# Patient Record
Sex: FEMALE | Race: WHITE | NOT HISPANIC OR LATINO | Employment: FULL TIME | URBAN - METROPOLITAN AREA
[De-identification: names, ages, dates, MRNs, and addresses within clinical notes are randomized per-mention and may not be internally consistent; named-entity substitution may affect disease eponyms.]

---

## 2017-06-01 ENCOUNTER — OFFICE VISIT (OUTPATIENT)
Dept: GYNECOLOGIC ONCOLOGY | Facility: CLINIC | Age: 37
End: 2017-06-01

## 2017-06-01 VITALS
SYSTOLIC BLOOD PRESSURE: 131 MMHG | DIASTOLIC BLOOD PRESSURE: 79 MMHG | WEIGHT: 173 LBS | BODY MASS INDEX: 29.53 KG/M2 | HEIGHT: 64 IN | TEMPERATURE: 98.1 F | RESPIRATION RATE: 25 BRPM | OXYGEN SATURATION: 98 % | HEART RATE: 115 BPM

## 2017-06-01 DIAGNOSIS — Z01.419 WELL WOMAN EXAM WITH ROUTINE GYNECOLOGICAL EXAM: Primary | ICD-10-CM

## 2017-06-01 DIAGNOSIS — Z30.09 ENCOUNTER FOR OTHER GENERAL COUNSELING OR ADVICE ON CONTRACEPTION: ICD-10-CM

## 2017-06-01 PROCEDURE — 99395 PREV VISIT EST AGE 18-39: CPT | Performed by: NURSE PRACTITIONER

## 2017-06-01 RX ORDER — LAMOTRIGINE 150 MG/1
150 TABLET ORAL 2 TIMES DAILY
COMMUNITY
End: 2019-03-21 | Stop reason: SINTOL

## 2017-06-01 RX ORDER — LANSOPRAZOLE 30 MG/1
30 CAPSULE, DELAYED RELEASE ORAL DAILY
COMMUNITY
End: 2018-07-23 | Stop reason: SDUPTHER

## 2017-06-01 RX ORDER — ARIPIPRAZOLE 5 MG/1
5 TABLET ORAL DAILY
COMMUNITY
End: 2018-05-14 | Stop reason: DRUGHIGH

## 2017-06-01 RX ORDER — DIAZEPAM 5 MG/1
5 TABLET ORAL AS NEEDED
COMMUNITY
End: 2021-03-26 | Stop reason: SDUPTHER

## 2017-06-01 RX ORDER — ACETAMINOPHEN 500 MG
500 TABLET ORAL AS NEEDED
COMMUNITY
End: 2020-11-24 | Stop reason: SDUPTHER

## 2017-06-01 NOTE — PROGRESS NOTES
GYN ONCOLOGY ANNUAL WELL WOMAN VISIT      Jesica Aguilera  [id number]  1980      Chief Complaint: Gynecologic Exam (here for annual and to talk about IUD)        History of present illness:  Jesica Aguilera is a 36 y.o. year old female who is here today for an annual exam.  She has a personal history of dyspareunia, pelvic pain, anal fissure, and hemorrhoids.  She is status post anal fissure repair in 2012.  She denies any new concerns and states she is feeling very well today.  She denies abnormal vaginal bleeding, pelvic pain, concerning lesions, changes in bowel or bladder function, or breast concerns.  She reports bilateral nipple piercings that were done only 1 week ago. She has a history of abnormal Pap smear ×1 and requests a Pap smear was today's exam.    She has a son who is almost 10 years old and she is now desiring of a long-term contraceptive.  At her last annual visit with me in 2015 she had reported a return to smoking and was planning on starting Chantix.  She describes shortly following this time having a bad interaction between her Lamictal and Chantix, causing severe cognitive and mental health changes.  She describes she spent 2-3 days on the behavioral health unit for close monitoring.  These problems have been appropriately treated and she denies SI today.  She does report ongoing daily smoking, approximately 1 pack per day.  She is understanding that estrogen containing contraceptives at her at high risk for blood clots.  She is most interested in alternate contraceptive forms including IUDs.        Obstetric History:  OB History      Para Term  AB TAB SAB Ectopic Multiple Living    1 1        1         Menstrual History:     Patient's last menstrual period was 2017.          Past Medical History:   Diagnosis Date   • Acid reflux    • Anxiety    • Arthritis    • Asthma    • Dyspareunia, female    • Migraines    • Pelvic pain    • Seasonal allergies         Past Surgical History:   Procedure Laterality Date   • HEMORRHOIDECTOMY      anal fissure, sphincter       MEDICATIONS: The current medication list was reviewed and reconciled.     Allergies:  has No Known Allergies.    Family History   Problem Relation Age of Onset   • Hypertension Mother    • Hypertension Father    • No Known Problems Sister    • No Known Problems Brother    • No Known Problems Maternal Grandmother    • No Known Problems Maternal Grandfather    • No Known Problems Paternal Grandmother    • No Known Problems Paternal Grandfather        Health Maintenance:  Last pap smear was 11/2015, results were  normal PAP..    Review of Systems   Constitutional: Negative for fatigue, fever and unexpected weight change.   HENT: Negative for congestion, ear pain, hearing loss, sinus pressure and trouble swallowing.    Eyes: Negative for visual disturbance.   Respiratory: Negative for cough, chest tightness, shortness of breath and wheezing.    Cardiovascular: Negative for chest pain, palpitations and leg swelling.   Gastrointestinal: Negative for abdominal distention, abdominal pain, constipation, diarrhea, nausea and vomiting.   Endocrine: Negative for cold intolerance, heat intolerance, polydipsia, polyphagia and polyuria.   Genitourinary: Negative for difficulty urinating, dyspareunia, dysuria, frequency, hematuria, pelvic pain, urgency, vaginal bleeding, vaginal discharge and vaginal pain.   Musculoskeletal: Negative for arthralgias, gait problem, joint swelling and myalgias.   Skin: Negative for color change, pallor and rash.   Neurological: Negative for dizziness, seizures, syncope, weakness, light-headedness, numbness and headaches.   Hematological: Negative for adenopathy. Does not bruise/bleed easily.   Psychiatric/Behavioral: Negative for agitation, confusion, sleep disturbance and suicidal ideas. The patient is not nervous/anxious.        Physical Exam  Vital Signs: /79  Pulse 115  Temp  "98.1 °F (36.7 °C) (Temporal Artery )   Resp 25  Ht 64\" (162.6 cm)  Wt 173 lb (78.5 kg)  LMP 05/01/2017  SpO2 98%  BMI 29.7 kg/m2   General Appearance:  alert, cooperative, no apparent distress and appears stated age   Neurologic/Psychiatric: A&O x 3, gait steady, appropriate affect   HEENT:  Normocephalic, without obvious abnormality, mucous membranes moist   Neck: Supple, symmetrical, trachea midline, no adenopathy;  No thyromegaly, masses, or tenderness   Back:   Symmetric, no curvature, ROM normal, no CVA tenderness   Lungs:   Clear to auscultation bilaterally; respirations regular, even, and unlabored bilaterally   Heart:  Regular rate and rhythm, no murmurs appreciated   Breasts:  Symmetrical, no masses, no lesions, no nipple discharge and nipple piercing noted bilaterally (healing well)   Abdomen:   Soft, non-tender, non-distended and no organomegaly   Lymph nodes: No cervical, supraclavicular, inguinal or axillary adenopathy noted   Extremities: Normal, atraumatic; no clubbing, cyanosis, or edema    Skin: No rashes, ulcers, or suspicious lesions noted   Pelvic: External Genitalia  without lesions or skin changes  Vagina  is pink, moist, without lesions.   Cervix  normal, without lesions, no discharge, no CMT and pap obtained  Uterus  normal size, midposition, mobile and nontender  Ovaries  without palpable masses or fullness  Parametria  smooth  Rectovaginal  Female rectovaginal: deferred       Procedure Note:  No notes on file    Assessment and Plan:    Jesica was seen today for gynecologic exam.    Diagnoses and all orders for this visit:    Well woman exam with routine gynecological exam  -     Pap IG, HPV-hr; Future    Encounter for other general counseling or advice on contraception        Pap was done today per patient request even though it has been less then 3 years since her last Pap smear.  If she does not receive the results of the Pap within 2 weeks  time, she was instructed to call to find " out the results.  I explained to Jesica that the recommendations for Pap smear interval in a low risk patient's has lengthened to 3 years time.  I encouraged her to be seen yearly for a full physical exam including breast and pelvic exam even during the off years when PAP's will not be performed.    She was recommended to begin mammograms at age 40 for breast cancer screening, colonoscopy at age 50 for colon cancer screening and bone density scans (DEXA) at age 60-65 for osteoporosis screening.    Jesica and I discussed hormonal management options including progestin only mini pill, Depo-Provera injection,  Nexplanon, and various IUDs.  Estrogen containing options were ruled out due to current smoking status.  After discussion she reports she is desiring of a long-term, reversible contraceptive but also allows for her to have light to minimal periods.  At the conclusion of our discussion she is most interested in Mirena.  She is status post vaginal delivery ×1.  I informed her that the ideal time to place this is when she is currently on her period.  She states this should start in approximately 6-8 days.  This should allow for ample time to order and obtain the device.  She will be called once device to clinic to schedule placement.  Our office orders IUDs through [facility].  An order form will be faxed today and again will prior authorized this with her insurance.  If there is any issue with insurance coverage our office and patient will both be notified prior to shipment.  She verbalized understanding.  IUD insertion procedure risks, preparation, and precautions reviewed.  She was instructed to take 600-800 mg ibuprofen 30 minutes prior to insertion.       Return for IUD insertion.      JAXON Becerra      Note: Speech recognition transcription software was used to dictate portions of this document.  An attempt at proofreading has been made though minor errors in transcription may still be  present.  Please do not hesitate to call our office with any questions.

## 2017-06-05 ENCOUNTER — TELEPHONE (OUTPATIENT)
Dept: GYNECOLOGIC ONCOLOGY | Facility: CLINIC | Age: 37
End: 2017-06-05

## 2017-06-05 PROBLEM — M19.90 ARTHRITIS: Status: ACTIVE | Noted: 2017-06-05

## 2017-06-05 PROBLEM — R10.9 ABDOMINAL PAIN: Status: ACTIVE | Noted: 2017-06-05

## 2017-06-05 PROBLEM — G43.909 MIGRAINES: Status: ACTIVE | Noted: 2017-06-05

## 2017-06-05 PROBLEM — L73.9 FOLLICULITIS: Status: ACTIVE | Noted: 2017-06-05

## 2017-06-05 NOTE — TELEPHONE ENCOUNTER
Patient returned my call about mirena placement. She is to have it put in late on Friday evening.

## 2017-06-09 ENCOUNTER — OFFICE VISIT (OUTPATIENT)
Dept: GYNECOLOGIC ONCOLOGY | Facility: CLINIC | Age: 37
End: 2017-06-09

## 2017-06-09 VITALS
SYSTOLIC BLOOD PRESSURE: 127 MMHG | HEART RATE: 99 BPM | TEMPERATURE: 98.3 F | DIASTOLIC BLOOD PRESSURE: 78 MMHG | BODY MASS INDEX: 29.87 KG/M2 | WEIGHT: 174 LBS | RESPIRATION RATE: 16 BRPM | OXYGEN SATURATION: 96 %

## 2017-06-09 DIAGNOSIS — Z30.430 ENCOUNTER FOR INSERTION OF INTRAUTERINE CONTRACEPTIVE DEVICE: Primary | ICD-10-CM

## 2017-06-09 PROCEDURE — 99212-NC PR NO CHARGE CBC OFFICE OUTPATIENT VISIT 10 MINUTES: Performed by: NURSE PRACTITIONER

## 2017-06-09 PROCEDURE — 58300 INSERT INTRAUTERINE DEVICE: CPT | Performed by: NURSE PRACTITIONER

## 2017-06-09 NOTE — PROGRESS NOTES
IUD INSERTION    Jesica Aguilera  1980  [id number]      After discussion of procedure risks and benefits and obtaining consent, an IUD insertion was performed. The patient was placed in stirrups and a bimanual exam was performed. The uterus was found to be mid position. A speculum exam was then performed and the cervix was well visualized. Cervix was cleaned with Betadine. Uterus sounded to 7 cm and a tenaculum was not needed to grasp the anterior cervix. Mirena was then inserted without complication. Strings were cut to approx 3-4 cm and speculum was removed. There was no bleeding noted with procedure and the patient tolerated this well. Home precautions were discussed and patient was instructed to take ibuprofen for cramping at home tonight.    Encouraged to call the office for any increase in bleeding, pain, or fevers. Call if unable to feel IUD strings.  RTC for annual exam or PRN.

## 2017-06-30 ENCOUNTER — TELEPHONE (OUTPATIENT)
Dept: GYNECOLOGIC ONCOLOGY | Facility: CLINIC | Age: 37
End: 2017-06-30

## 2017-06-30 NOTE — TELEPHONE ENCOUNTER
Tried calling patient back about bleeding after mirena insertion. She did not answer. She had called  complaining of vaginal bleeding for 9 days that began as spotting and then a normal pharmacy.Informed patient having a period of after mirena insertion was normal. To monitor this. Any heavier bleeding saturating pads every hour or two. New onset of fatigue or dizziness to contact our medical exchange as we will not be in the office until Wednesday.  I informed her Lani was aware of this issue and would be contacting her. This was all left in the patients voicemail.

## 2018-01-10 ENCOUNTER — OFFICE VISIT (OUTPATIENT)
Dept: PULMONOLOGY | Facility: CLINIC | Age: 38
End: 2018-01-10

## 2018-01-10 VITALS
WEIGHT: 187 LBS | SYSTOLIC BLOOD PRESSURE: 120 MMHG | HEIGHT: 64 IN | HEART RATE: 86 BPM | OXYGEN SATURATION: 98 % | TEMPERATURE: 98.7 F | BODY MASS INDEX: 31.92 KG/M2 | DIASTOLIC BLOOD PRESSURE: 74 MMHG

## 2018-01-10 DIAGNOSIS — R06.02 SHORTNESS OF BREATH: Primary | ICD-10-CM

## 2018-01-10 DIAGNOSIS — Z71.6 TOBACCO ABUSE COUNSELING: ICD-10-CM

## 2018-01-10 DIAGNOSIS — J44.9 CHRONIC OBSTRUCTIVE PULMONARY DISEASE, UNSPECIFIED COPD TYPE (HCC): ICD-10-CM

## 2018-01-10 PROCEDURE — 94729 DIFFUSING CAPACITY: CPT | Performed by: INTERNAL MEDICINE

## 2018-01-10 PROCEDURE — 94060 EVALUATION OF WHEEZING: CPT | Performed by: INTERNAL MEDICINE

## 2018-01-10 PROCEDURE — 94726 PLETHYSMOGRAPHY LUNG VOLUMES: CPT | Performed by: INTERNAL MEDICINE

## 2018-01-10 PROCEDURE — 99407 BEHAV CHNG SMOKING > 10 MIN: CPT | Performed by: INTERNAL MEDICINE

## 2018-01-10 PROCEDURE — 99204 OFFICE O/P NEW MOD 45 MIN: CPT | Performed by: INTERNAL MEDICINE

## 2018-01-10 RX ORDER — LEVALBUTEROL TARTRATE 45 UG/1
3 AEROSOL, METERED ORAL ONCE
Status: COMPLETED | OUTPATIENT
Start: 2018-01-10 | End: 2018-01-10

## 2018-01-10 RX ORDER — ALBUTEROL SULFATE 90 UG/1
2 AEROSOL, METERED RESPIRATORY (INHALATION) EVERY 4 HOURS PRN
COMMUNITY
End: 2020-09-15 | Stop reason: SDUPTHER

## 2018-01-10 RX ADMIN — LEVALBUTEROL TARTRATE 3 PUFF: 45 AEROSOL, METERED ORAL at 08:29

## 2018-01-10 NOTE — PROGRESS NOTES
Initial Pulmonary Consult Note    Subjective   Reason for consultation/Chief Complaint: Shortness of Breath    Jesica Aguilera is a 37 y.o. female is being seen for consultation today at the request of Dr. Herrera    History of Present Illness  Ms. Aguilera is a 38yo F with a history of tobacco abuse who is referred for shortness of breath. She thinks that her symptoms have been ongoing for years but have recently worsened over the past couple of years. Her shortness of breath mainly occurs with exertion. She has associated cough and nighttime awakenings almost every night. She also notes wheezing that is worse with exertion and at night. She has an Albuterol inhaler but she doesn't use it. She cannot think of any relieving factors. She has a significant smoking history. She has smoked 1-1.5ppd since the age of 14. She has tried Chantix which worked for smoking cessation. However, it exacerbated her Bipolar disorder and she ended up in the Behavioral Health Unit at [facility] for 3 days. The longest time that she has been able to quit was 1-2 years when she was pregnant with her son. She began smoking again when he was approximately 1 year of age. Her mother has COPD which was diagnosed in her 50s. She thinks her Dad likely has COPD as well but he has not been tested. All of her siblings smoke.       Active Ambulatory Problems     Diagnosis Date Noted   • Seasonal allergies 06/05/2017   • Acid reflux 06/05/2017   • Arthritis 06/05/2017   • Migraines 06/05/2017   • Abdominal pain 06/05/2017   • Folliculitis 06/05/2017     Resolved Ambulatory Problems     Diagnosis Date Noted   • No Resolved Ambulatory Problems     Past Medical History:   Diagnosis Date   • Acid reflux    • Anxiety    • Arthritis    • Asthma    • Dyspareunia, female    • Fatigue    • Migraines    • Pelvic pain    • Seasonal allergies        Past Surgical History:   Procedure Laterality Date   • HEMORRHOIDECTOMY      anal fissure, sphincter        Family History   Problem Relation Age of Onset   • Hypertension Mother    • Hypertension Father    • No Known Problems Sister    • No Known Problems Brother    • No Known Problems Maternal Grandmother    • No Known Problems Maternal Grandfather    • No Known Problems Paternal Grandmother    • No Known Problems Paternal Grandfather        Social History     Social History   • Marital status:      Spouse name: N/A   • Number of children: 1   • Years of education: N/A     Occupational History   • Not on file.     Social History Main Topics   • Smoking status: Current Every Day Smoker     Packs/day: 0.50     Types: Cigarettes   • Smokeless tobacco: Not on file   • Alcohol use No      Comment: rare   • Drug use: No   • Sexual activity: Defer     Other Topics Concern   • Not on file     Social History Narrative       No Known Allergies    Current Outpatient Prescriptions   Medication Sig Dispense Refill   • acetaminophen (TYLENOL) 500 MG tablet Take 500 mg by mouth As Needed for Mild Pain (1-3).     • albuterol (PROVENTIL HFA;VENTOLIN HFA) 108 (90 Base) MCG/ACT inhaler Inhale 2 puffs Every 4 (Four) Hours As Needed for Wheezing.     • ARIPiprazole (ABILIFY) 5 MG tablet Take 5 mg by mouth Daily. Takes half a tab     • diazePAM (VALIUM) 5 MG tablet Take 5 mg by mouth As Needed for Anxiety.     • lamoTRIgine (LAMICTAL) 150 MG tablet Take 150 mg by mouth 2 (Two) Times a Day.     • lansoprazole (PREVACID) 30 MG capsule Take 30 mg by mouth Daily.     • levonorgestrel (MIRENA, 52 MG,) 20 MCG/24HR IUD 1 each by Intrauterine route 1 (One) Time.     • methocarbamol (ROBAXIN) 750 MG tablet Take 1 tablet by mouth 3 (Three) Times a Day As Needed for Muscle Spasms. 30 tablet 0   • Omega-3 Fatty Acids (OMEGA 3 PO) Take  by mouth Daily.     • Probiotic Product (PROBIOTIC ADVANCED PO) Take  by mouth Daily.     • tiotropium bromide-olodaterol (STIOLTO RESPIMAT) 2.5-2.5 MCG/ACT aerosol solution inhaler Inhale 1 puff Daily. 2 inh  "once a day 1 inhaler 0     No current facility-administered medications for this visit.        Review of Systems   Constitutional: Positive for activity change and fatigue.   HENT: Positive for rhinorrhea, sinus pressure, sneezing and sore throat.    Eyes: Positive for photophobia and itching.   Respiratory: Positive for cough, chest tightness, shortness of breath and wheezing.    Cardiovascular: Negative.    Gastrointestinal: Negative.    Endocrine: Negative.    Genitourinary: Negative.    Musculoskeletal: Positive for back pain and neck stiffness.   Skin: Negative.    Allergic/Immunologic: Negative.    Neurological: Positive for dizziness, light-headedness and headaches.   Hematological: Negative.    Psychiatric/Behavioral: Positive for agitation and dysphoric mood. The patient is nervous/anxious.          Objective   Blood pressure 120/74, pulse 86, temperature 98.7 °F (37.1 °C), height 162.6 cm (64\"), weight 84.8 kg (187 lb), SpO2 98 %.  Physical Exam   Constitutional: She is oriented to person, place, and time. She appears well-developed and well-nourished. No distress.   HENT:   Head: Normocephalic and atraumatic.   Mouth/Throat: Oropharynx is clear and moist.   Eyes: Conjunctivae are normal. Pupils are equal, round, and reactive to light. No scleral icterus.   Neck: Normal range of motion. Neck supple. No tracheal deviation present. No thyromegaly present.   Cardiovascular: Normal rate, regular rhythm and normal heart sounds.    Pulmonary/Chest: Effort normal and breath sounds normal. No respiratory distress.   Abdominal: Soft. Bowel sounds are normal. There is no tenderness.   Musculoskeletal: Normal range of motion. She exhibits no edema.   Lymphadenopathy:     She has no cervical adenopathy.   Neurological: She is alert and oriented to person, place, and time. She exhibits normal muscle tone. Coordination normal.   Skin: Skin is warm and dry. No rash noted. No erythema.   Psychiatric: She has a normal mood " and affect. Her speech is normal and behavior is normal. Judgment normal.   Nursing note and vitals reviewed.      PFTs:  Performed in clinic and personally reviewed.   There is moderate airway obstruction. There is no significant bronchodilator response.   There is mild restriction.   DLCO is normal.     Imaging:  CXR performed in clinic and personally reviewed. There is no cardiomegaly. The lung fields are well expanded bilaterally. There are no focal infiltrates or suspicious nodules appreciable. There is no acute cardiopulmonary process.     Assessment/Plan   Jesica was seen today for cough and shortness of breath.    Diagnoses and all orders for this visit:    Shortness of breath  -     Pulmonary Function Test  -     levalbuterol (XOPENEX HFA) inhaler 3 puff; Inhale 3 puffs 1 (One) Time.  -     XR Chest PA & Lateral    Chronic obstructive pulmonary disease, unspecified COPD type    Tobacco abuse counseling    Other orders  -     tiotropium bromide-olodaterol (STIOLTO RESPIMAT) 2.5-2.5 MCG/ACT aerosol solution inhaler; Inhale 1 puff Daily. 2 inh once a day        Discussion:  Ms. Aguilera is a 36yo F with a history of tobacco abuse who is referred for shortness of breath. Her PFTs are consistent with moderate COPD.     Plan:  1. Start Stiolto once daily. She will let us know if this helps. If she notes improvement, we will send in a Rx.   2. Check Alpha-1-Antitrypsin phenotype and level.   3. > 50% of the visit devoted to smoking cessation counseling. We discussed the various methods and resources available to quit smoking. She has set a quit date of April. She will work with her therapist to help with medication adjustments and behavior therapy.   4. Return to clinic in 4 months or sooner if needed.          I personally spent over half of a total 45 minutes face to face with the patient in counseling and discussion and/or coordination of care as described above.       Monica V Case, DO  Pulmonary and Critical  Care Medicine

## 2018-02-07 ENCOUNTER — DOCUMENTATION (OUTPATIENT)
Dept: PULMONOLOGY | Facility: CLINIC | Age: 38
End: 2018-02-07

## 2018-02-07 NOTE — PROGRESS NOTES
Spoke to pt today. Informed her of change of inhalers due to insurance. Pt was concerned about this, she doesn't like using inhalers and feels the Stiolto worked for her. I explained to pt that a PA would be denied until she tries the preferred alternative. I advised pt to call if she has any issues with the Anoro. Pt stated understanding.

## 2018-03-02 ENCOUNTER — TELEPHONE (OUTPATIENT)
Dept: PULMONOLOGY | Facility: CLINIC | Age: 38
End: 2018-03-02

## 2018-03-02 NOTE — TELEPHONE ENCOUNTER
Pt GOYOM requesting a call back @ [phone] regarding her inhaler Anoro. Spoke with pt and she stated that she was given samples of Rx Stiolto Respimat and seems to be working very well and needing samples until PA gets done for Rx Anoro. Informed pt that samples of Rx Stiolto Respimat will be placed up front and will inform Katherine(MA) that PA can be started for Rx Anoro. Pt verbalized understanding.

## 2018-03-02 NOTE — TELEPHONE ENCOUNTER
PA submitted on Covermymeds, approved. Called pt and informed her. Advised pt she should have a rx waiting for her at the pharmacy from the last rx sent but if not or if she needs additional refills to call us back. Pt stated understanding.

## 2018-03-13 ENCOUNTER — TELEPHONE (OUTPATIENT)
Dept: GYNECOLOGIC ONCOLOGY | Facility: CLINIC | Age: 38
End: 2018-03-13

## 2018-03-13 ENCOUNTER — OFFICE VISIT (OUTPATIENT)
Dept: GYNECOLOGIC ONCOLOGY | Facility: CLINIC | Age: 38
End: 2018-03-13

## 2018-03-13 VITALS
HEART RATE: 106 BPM | SYSTOLIC BLOOD PRESSURE: 120 MMHG | OXYGEN SATURATION: 97 % | DIASTOLIC BLOOD PRESSURE: 69 MMHG | RESPIRATION RATE: 16 BRPM | BODY MASS INDEX: 32.1 KG/M2 | WEIGHT: 187 LBS | TEMPERATURE: 98.6 F

## 2018-03-13 DIAGNOSIS — S31.109A WOUND OF LEFT GROIN, INITIAL ENCOUNTER: Primary | ICD-10-CM

## 2018-03-13 DIAGNOSIS — L73.9 FOLLICULITIS: ICD-10-CM

## 2018-03-13 PROCEDURE — 99213 OFFICE O/P EST LOW 20 MIN: CPT | Performed by: NURSE PRACTITIONER

## 2018-03-13 PROCEDURE — 10060 I&D ABSCESS SIMPLE/SINGLE: CPT | Performed by: NURSE PRACTITIONER

## 2018-03-13 RX ORDER — BUPROPION HYDROCHLORIDE 150 MG/1
150 TABLET ORAL DAILY
COMMUNITY
End: 2018-07-23

## 2018-03-13 RX ORDER — SPIRONOLACTONE 25 MG/1
25 TABLET ORAL 2 TIMES DAILY
Qty: 60 TABLET | Refills: 1 | Status: SHIPPED | OUTPATIENT
Start: 2018-03-13 | End: 2018-05-14 | Stop reason: SDUPTHER

## 2018-03-14 PROCEDURE — 87147 CULTURE TYPE IMMUNOLOGIC: CPT | Performed by: NURSE PRACTITIONER

## 2018-03-14 PROCEDURE — 87070 CULTURE OTHR SPECIMN AEROBIC: CPT | Performed by: NURSE PRACTITIONER

## 2018-03-14 PROCEDURE — 87077 CULTURE AEROBIC IDENTIFY: CPT | Performed by: NURSE PRACTITIONER

## 2018-03-14 PROCEDURE — 87205 SMEAR GRAM STAIN: CPT | Performed by: NURSE PRACTITIONER

## 2018-03-14 PROCEDURE — 87186 SC STD MICRODIL/AGAR DIL: CPT | Performed by: NURSE PRACTITIONER

## 2018-03-19 ENCOUNTER — TELEPHONE (OUTPATIENT)
Dept: GYNECOLOGIC ONCOLOGY | Facility: CLINIC | Age: 38
End: 2018-03-19

## 2018-03-19 NOTE — TELEPHONE ENCOUNTER
AJXON Becerra MA             Please call lab and request susceptibility. Thanks!            Lab notified. They advised it was normal skin jamey. Noted. Lani Guerrero wants this susceptibility performed because the patient has a recurrent infection that has yet to be successfully treated with abx.

## 2018-03-20 ENCOUNTER — TELEPHONE (OUTPATIENT)
Dept: GYNECOLOGIC ONCOLOGY | Facility: CLINIC | Age: 38
End: 2018-03-20

## 2018-03-20 NOTE — TELEPHONE ENCOUNTER
Patient called for culture results. Pt informed suseptibility sent to lab for abx. Patient states she has another bump coming up in the same area. I asked if she washes her sheets regularly and does hibiclens and sits baths. She states she does it all with no relief. 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 She was infected as a child and has had multiple problems. She is a little frustrated that we are waiting on an abx because she states it may clear up what is currently going on but it will ultimately re-occur, but is hopeful that it may work. Told her glenda will call with a plan.

## 2018-03-21 LAB
BACTERIA SPEC AEROBE CULT: ABNORMAL
BACTERIA SPEC AEROBE CULT: ABNORMAL
GRAM STN SPEC: ABNORMAL

## 2018-03-21 NOTE — PROGRESS NOTES
GYN ONCOLOGY FOLLOW-UP    Jesica Aguilera  [id number]  1980    Chief Complaint: Follow-up (recurrent boils )        History of present illness:  Jesica Aguilera is a 37 y.o. year old female who is here today for complaint of recurrent boils to bilateral inner thighs. These have been a recurrent problem for her for some time now. She has 2 new areas to bilateral inner thighs (one on each) that started 5 days ago and are very painful. The area to the right side ruptured and drained spontaneously at home yesterday but is not yet resolved. The area to the left is very large. She states these rub against her clothing, making sitting and walking very painful. She feels these occur cyclicly each month around her period and wonders if there may be a hormonal component.   Jesica has tried OTC washes and topical Bactroban ointment without relief. She feels frustrated as dermatology has been unable to help although she has been evaluated multiple times.         Past Medical History:   Diagnosis Date   • Acid reflux    • Anxiety    • Arthritis    • Asthma    • Dyspareunia, female    • Fatigue    • Migraines    • Pelvic pain    • Seasonal allergies        Past Surgical History:   Procedure Laterality Date   • HEMORRHOIDECTOMY      anal fissure, sphincter       MEDICATIONS: The current medication list was reviewed and reconciled.     Allergies:  has No Known Allergies.    Family History   Problem Relation Age of Onset   • Hypertension Mother    • Hypertension Father    • No Known Problems Sister    • No Known Problems Brother    • No Known Problems Maternal Grandmother    • No Known Problems Maternal Grandfather    • No Known Problems Paternal Grandmother    • No Known Problems Paternal Grandfather        Review of Systems   Constitutional: Negative for appetite change, chills, fatigue, fever and unexpected weight change.   Respiratory: Negative for cough, shortness of breath and wheezing.    Cardiovascular: Negative  for chest pain, palpitations and leg swelling.   Gastrointestinal: Negative for abdominal distention, abdominal pain, blood in stool, constipation, diarrhea, nausea and vomiting.   Endocrine: Negative.    Genitourinary: Positive for genital sores (boils to bilateral inner thighs and external genitalia, recurrent). Negative for dyspareunia, dysuria, frequency, hematuria, pelvic pain, urgency, vaginal bleeding, vaginal discharge and vaginal pain.   Musculoskeletal: Negative for arthralgias, gait problem and joint swelling.   Neurological: Negative for dizziness, seizures, syncope, weakness, light-headedness, numbness and headaches.   Hematological: Negative for adenopathy.   Psychiatric/Behavioral: Negative.        Physical Exam  Vital Signs: /69   Pulse 106   Temp 98.6 °F (37 °C) (Temporal Artery )   Resp 16   Wt 84.8 kg (187 lb)   SpO2 97%   BMI 32.10 kg/m²    General Appearance:  alert, cooperative, no apparent distress and appears stated age   Neurologic/Psychiatric: A&O x 3, gait steady, appropriate affect   HEENT:  Normocephalic, without obvious abnormality, mucous membranes moist   Abdomen:   Soft, non-tender, non-distended, no organomegaly and Exam limited d/t habitus.   Lymph nodes: No cervical, supraclavicular, inguinal or axillary adenopathy noted   Extremities: Normal, atraumatic; no clubbing, cyanosis, or edema    Pelvic: External Genitalia  no vulvar lesions noted bilaterally. Large erythematous, tender area to left innr thigh near inguinal fold and smaller erythematous tender area to right inner thigh near inguinal fold. Right area has evidence of opening where fluid has drained  Vagina  is pink, moist, without lesions.   Cervix  normal, without lesions, no discharge, no CMT and IUD strings well visualized  Uterus  normal size, midposition, mobile and nontender  Ovaries  without palpable masses or fullness  Parametria  smooth  Rectovaginal  Female rectovaginal: deferred       Procedure  Notes:  I&D of bilateral lesions attempted. 1% lidocaine injected locally and small incisions made bilaterally. Only minimal drainage could be achieved. Swab obtained for culture. Bleeding was minimal and patient tolerated procedure well.     Assessment and Plan:    Jesica was seen today for follow-up.    Diagnoses and all orders for this visit:    Wound of left groin, initial encounter  -     Wound Culture - Drainage, Groin; Future  -     Wound Culture - Drainage, Groin    Folliculitis    Other orders  -     spironolactone (ALDACTONE) 25 MG tablet; Take 1 tablet by mouth 2 (Two) Times a Day.        Only minimal drainage of bilateral boils could be achieved. There is concern regarding possible tracking beneath right side due to size of the infected area. Patient reports topical antibiotics have not been helpful. Culture obtained today and we discussed plan for antibiotic suppression. Boils also tend to recur around her menses and may have a hormonal component. We will also attempt management with spironolactone in an attempt to suppress recurrences. We reviewed medication instructions, risks, benefits, and potential side effects. She will be notified of culture and sensitivity results and started on antibiotic therapy once this has returned. She v/u.   She was also encouraged to get Hibiclens OTC and clean area twice daily, avoiding the vagina if possible.   We will plan short term follow-up of new treatments in approx 8 weeks.     Return in about 8 weeks (around 5/8/2018) for follow-up.      JAXON Becerra      Note: Speech recognition transcription software was used to dictate portions of this document.  An attempt at proofreading has been made though minor errors in transcription may still be present.  Please do not hesitate to call our office with any questions.

## 2018-03-22 RX ORDER — SULFAMETHOXAZOLE AND TRIMETHOPRIM 400; 80 MG/1; MG/1
1 TABLET ORAL DAILY
Qty: 30 TABLET | Refills: 1 | Status: SHIPPED | OUTPATIENT
Start: 2018-03-22 | End: 2018-05-14 | Stop reason: SDUPTHER

## 2018-03-22 NOTE — TELEPHONE ENCOUNTER
Called patient to discuss culture results. Susceptibility to Bactrim found. Will start on daily antibiotic suppression with low-dose Bactrim in addition to daily Hibiclens and spironolactone. Encouraged to call if any GI upset from medication and this can be changed if needed. If tolerating well, continue treatment until next visit in 5/2018.

## 2018-04-20 ENCOUNTER — HOSPITAL ENCOUNTER (EMERGENCY)
Facility: HOSPITAL | Age: 38
Discharge: HOME | End: 2018-04-20
Attending: EMERGENCY MEDICINE | Admitting: EMERGENCY MEDICINE

## 2018-04-20 ENCOUNTER — APPOINTMENT (OUTPATIENT)
Dept: CT IMAGING | Facility: HOSPITAL | Age: 38
End: 2018-04-20

## 2018-04-20 VITALS
RESPIRATION RATE: 16 BRPM | HEART RATE: 78 BPM | OXYGEN SATURATION: 99 % | BODY MASS INDEX: 30.73 KG/M2 | TEMPERATURE: 98.6 F | WEIGHT: 180 LBS | DIASTOLIC BLOOD PRESSURE: 85 MMHG | SYSTOLIC BLOOD PRESSURE: 118 MMHG | HEIGHT: 64 IN

## 2018-04-20 DIAGNOSIS — R10.9 ACUTE RIGHT FLANK PAIN: Primary | ICD-10-CM

## 2018-04-20 LAB
ALBUMIN SERPL-MCNC: 5 G/DL (ref 3.2–4.8)
ALBUMIN/GLOB SERPL: 1.8 G/DL (ref 1.5–2.5)
ALP SERPL-CCNC: 70 U/L (ref 25–100)
ALT SERPL W P-5'-P-CCNC: 27 U/L (ref 7–40)
ANION GAP SERPL CALCULATED.3IONS-SCNC: 5 MMOL/L (ref 3–11)
AST SERPL-CCNC: 26 U/L (ref 0–33)
B-HCG UR QL: NEGATIVE
BACTERIA UR QL AUTO: ABNORMAL /HPF
BASOPHILS # BLD AUTO: 0.03 10*3/MM3 (ref 0–0.2)
BASOPHILS NFR BLD AUTO: 0.3 % (ref 0–1)
BILIRUB SERPL-MCNC: 0.4 MG/DL (ref 0.3–1.2)
BILIRUB UR QL STRIP: NEGATIVE
BUN BLD-MCNC: 11 MG/DL (ref 9–23)
BUN/CREAT SERPL: 11 (ref 7–25)
CALCIUM SPEC-SCNC: 9.2 MG/DL (ref 8.7–10.4)
CHLORIDE SERPL-SCNC: 101 MMOL/L (ref 99–109)
CLARITY UR: CLEAR
CO2 SERPL-SCNC: 26 MMOL/L (ref 20–31)
COLOR UR: YELLOW
CREAT BLD-MCNC: 1 MG/DL (ref 0.6–1.3)
DEPRECATED RDW RBC AUTO: 43.2 FL (ref 37–54)
EOSINOPHIL # BLD AUTO: 0.18 10*3/MM3 (ref 0–0.3)
EOSINOPHIL NFR BLD AUTO: 2 % (ref 0–3)
ERYTHROCYTE [DISTWIDTH] IN BLOOD BY AUTOMATED COUNT: 12.8 % (ref 11.3–14.5)
GFR SERPL CREATININE-BSD FRML MDRD: 63 ML/MIN/1.73
GLOBULIN UR ELPH-MCNC: 2.8 GM/DL
GLUCOSE BLD-MCNC: 103 MG/DL (ref 70–100)
GLUCOSE UR STRIP-MCNC: NEGATIVE MG/DL
HCT VFR BLD AUTO: 44.8 % (ref 34.5–44)
HGB BLD-MCNC: 15.2 G/DL (ref 11.5–15.5)
HGB UR QL STRIP.AUTO: NEGATIVE
HOLD SPECIMEN: NORMAL
HOLD SPECIMEN: NORMAL
HYALINE CASTS UR QL AUTO: ABNORMAL /LPF
IMM GRANULOCYTES # BLD: 0.03 10*3/MM3 (ref 0–0.03)
IMM GRANULOCYTES NFR BLD: 0.3 % (ref 0–0.6)
INTERNAL NEGATIVE CONTROL: NEGATIVE
INTERNAL POSITIVE CONTROL: POSITIVE
KETONES UR QL STRIP: NEGATIVE
LEUKOCYTE ESTERASE UR QL STRIP.AUTO: ABNORMAL
LIPASE SERPL-CCNC: 41 U/L (ref 6–51)
LYMPHOCYTES # BLD AUTO: 2.23 10*3/MM3 (ref 0.6–4.8)
LYMPHOCYTES NFR BLD AUTO: 25.4 % (ref 24–44)
Lab: NORMAL
MCH RBC QN AUTO: 31.5 PG (ref 27–31)
MCHC RBC AUTO-ENTMCNC: 33.9 G/DL (ref 32–36)
MCV RBC AUTO: 92.9 FL (ref 80–99)
MONOCYTES # BLD AUTO: 0.39 10*3/MM3 (ref 0–1)
MONOCYTES NFR BLD AUTO: 4.4 % (ref 0–12)
NEUTROPHILS # BLD AUTO: 5.96 10*3/MM3 (ref 1.5–8.3)
NEUTROPHILS NFR BLD AUTO: 67.9 % (ref 41–71)
NITRITE UR QL STRIP: NEGATIVE
PH UR STRIP.AUTO: 6 [PH] (ref 5–8)
PLATELET # BLD AUTO: 211 10*3/MM3 (ref 150–450)
PMV BLD AUTO: 10.4 FL (ref 6–12)
POTASSIUM BLD-SCNC: 3.8 MMOL/L (ref 3.5–5.5)
PROT SERPL-MCNC: 7.8 G/DL (ref 5.7–8.2)
PROT UR QL STRIP: NEGATIVE
RBC # BLD AUTO: 4.82 10*6/MM3 (ref 3.89–5.14)
RBC # UR: ABNORMAL /HPF
REF LAB TEST METHOD: ABNORMAL
SODIUM BLD-SCNC: 132 MMOL/L (ref 132–146)
SP GR UR STRIP: <=1.005 (ref 1–1.03)
SQUAMOUS #/AREA URNS HPF: ABNORMAL /HPF
UROBILINOGEN UR QL STRIP: ABNORMAL
WBC NRBC COR # BLD: 8.79 10*3/MM3 (ref 3.5–10.8)
WBC UR QL AUTO: ABNORMAL /HPF
WHOLE BLOOD HOLD SPECIMEN: NORMAL
WHOLE BLOOD HOLD SPECIMEN: NORMAL

## 2018-04-20 PROCEDURE — 85025 COMPLETE CBC W/AUTO DIFF WBC: CPT | Performed by: EMERGENCY MEDICINE

## 2018-04-20 PROCEDURE — 99284 EMERGENCY DEPT VISIT MOD MDM: CPT

## 2018-04-20 PROCEDURE — 36415 COLL VENOUS BLD VENIPUNCTURE: CPT

## 2018-04-20 PROCEDURE — 74176 CT ABD & PELVIS W/O CONTRAST: CPT

## 2018-04-20 PROCEDURE — 83690 ASSAY OF LIPASE: CPT | Performed by: EMERGENCY MEDICINE

## 2018-04-20 PROCEDURE — 80053 COMPREHEN METABOLIC PANEL: CPT | Performed by: EMERGENCY MEDICINE

## 2018-04-20 PROCEDURE — 81001 URINALYSIS AUTO W/SCOPE: CPT | Performed by: EMERGENCY MEDICINE

## 2018-04-20 RX ORDER — SODIUM CHLORIDE 0.9 % (FLUSH) 0.9 %
10 SYRINGE (ML) INJECTION AS NEEDED
Status: DISCONTINUED | OUTPATIENT
Start: 2018-04-20 | End: 2018-04-20 | Stop reason: HOSPADM

## 2018-04-20 NOTE — ED PROVIDER NOTES
Subjective   37-year-old female presents to the emergency department with complaints of right sided abdominal pain and right flank pain.  The patient states the pain began 6 days ago.  The pain was initially more in the mid abdomen that has now migrated into the right flank.  She has had some nausea without vomiting.  Normal urination and normal bowel movements.  No fever or chills.  She has a Mirena IUD in place for the past year.  No vaginal d/c or bleeding.  The pain is worse with touching the right side and with movement.      Past medical history of COPD.    No prior surgeries.    She smokes 1 ppd.  No alcohol or drug use.    Her PCP is Dr. Rohit Herrera.        History provided by:  Patient  Abdominal Pain   Pain location:  R flank  Pain quality: aching    Pain radiates to:  Back and R flank  Pain severity:  Moderate  Onset quality:  Gradual  Duration:  1 week  Timing:  Constant  Progression:  Worsening  Chronicity:  New  Relieved by:  Nothing  Worsened by:  Movement and palpation  Ineffective treatments:  None tried  Associated symptoms: nausea    Associated symptoms: no anorexia, no chest pain, no chills, no constipation, no cough, no diarrhea, no dysuria, no fever, no hematuria, no melena, no shortness of breath, no sore throat and no vomiting        Review of Systems   Constitutional: Negative for appetite change, chills and fever.   HENT: Negative for sore throat.    Respiratory: Negative for cough and shortness of breath.    Cardiovascular: Negative for chest pain.   Gastrointestinal: Positive for abdominal pain and nausea. Negative for anorexia, blood in stool, constipation, diarrhea, melena and vomiting.   Endocrine: Negative for polydipsia, polyphagia and polyuria.   Genitourinary: Negative for dysuria and hematuria.   Musculoskeletal: Positive for back pain.   Skin: Negative for rash.   Allergic/Immunologic: Negative for immunocompromised state.   Neurological: Negative for numbness and headaches.    Hematological: Negative.    Psychiatric/Behavioral: Negative.        Past Medical History:   Diagnosis Date   • Acid reflux    • Anxiety    • Arthritis    • Asthma    • Dyspareunia, female    • Fatigue    • Migraines    • Pelvic pain    • Seasonal allergies        No Known Allergies    Past Surgical History:   Procedure Laterality Date   • HEMORRHOIDECTOMY      anal fissure, sphincter       Family History   Problem Relation Age of Onset   • Hypertension Mother    • Hypertension Father    • No Known Problems Sister    • No Known Problems Brother    • No Known Problems Maternal Grandmother    • No Known Problems Maternal Grandfather    • No Known Problems Paternal Grandmother    • No Known Problems Paternal Grandfather        Social History     Social History   • Marital status:    • Number of children: 1     Social History Main Topics   • Smoking status: Current Every Day Smoker     Packs/day: 1.00     Types: Cigarettes   • Alcohol use No      Comment: rare   • Drug use: No   • Sexual activity: Defer     Other Topics Concern   • Not on file           Objective   Physical Exam   Constitutional: She is oriented to person, place, and time. She appears well-nourished. She appears distressed (appears uncomfortable).   HENT:   Nose: Nose normal.   Mouth/Throat: Oropharynx is clear and moist.   Eyes: Conjunctivae are normal. Pupils are equal, round, and reactive to light. Right eye exhibits no discharge.   Neck: Normal range of motion.   Cardiovascular: Normal rate, regular rhythm, normal heart sounds and intact distal pulses.    Pulmonary/Chest: Effort normal and breath sounds normal.   Abdominal: Soft. Bowel sounds are normal. There is tenderness (significant right sided and right flank tenderness with the mildest of palpation;). There is no guarding.   Musculoskeletal: Normal range of motion. She exhibits no tenderness.   Neurological: She is alert and oriented to person, place, and time.   Skin: Skin is warm  "and dry.   Psychiatric: She has a normal mood and affect.       Procedures         ED Course  ED Course   Comment By Time   Reid De León M.D.  I saw and evaluated this patient in the emergency department.  The patient is exquisitely tender in the right flank region even with dermal palpation however she notes the majority of her pain feels \"deep\" inside.  I agree with testing being performed by TERESO Fisher. Reid De León MD 04/20 0947      10:17 AM   Labs are unrevealing.  Nml white count.  Nml LFTs, electrolytes and renal fxns.  CT abd/pelvis without contrast is normal.  The pt's pain seems out of proportion.  With even the mildest of dermal palpation, she grimaces and winces, yet she states that the pain feels deep.  There is no rash and no distinct dermatomal pattern of pain to suggest shingles.  I spoke with the pt about her workup.  She mentions that she works with a  on leg strength since mid March.  This may represent an abdominal muscle strain of sorts?  Will d/c to treat with OTC Motrin (pt's preference) and have her f/u with her PCP.  Recent Results (from the past 24 hour(s))   POCT pregnancy, urine    Collection Time: 04/20/18  8:33 AM   Result Value Ref Range    HCG, Urine, QL Negative Negative    Lot Number [id number]     Internal Positive Control Positive     Internal Negative Control Negative    Comprehensive Metabolic Panel    Collection Time: 04/20/18  8:34 AM   Result Value Ref Range    Glucose 103 (H) 70 - 100 mg/dL    BUN 11 9 - 23 mg/dL    Creatinine 1.00 0.60 - 1.30 mg/dL    Sodium 132 132 - 146 mmol/L    Potassium 3.8 3.5 - 5.5 mmol/L    Chloride 101 99 - 109 mmol/L    CO2 26.0 20.0 - 31.0 mmol/L    Calcium 9.2 8.7 - 10.4 mg/dL    Total Protein 7.8 5.7 - 8.2 g/dL    Albumin 5.00 (H) 3.20 - 4.80 g/dL    ALT (SGPT) 27 7 - 40 U/L    AST (SGOT) 26 0 - 33 U/L    Alkaline Phosphatase 70 25 - 100 U/L    Total Bilirubin 0.4 0.3 - 1.2 mg/dL    eGFR Non African Amer 63 >60 mL/min/1.73 "    Globulin 2.8 gm/dL    A/G Ratio 1.8 1.5 - 2.5 g/dL    BUN/Creatinine Ratio 11.0 7.0 - 25.0    Anion Gap 5.0 3.0 - 11.0 mmol/L   Lipase    Collection Time: 04/20/18  8:34 AM   Result Value Ref Range    Lipase 41 6 - 51 U/L   Urinalysis With / Culture If Indicated - Urine, Clean Catch    Collection Time: 04/20/18  8:34 AM   Result Value Ref Range    Color, UA Yellow Yellow, Straw    Appearance, UA Clear Clear    pH, UA 6.0 5.0 - 8.0    Specific Gravity, UA <=1.005 1.001 - 1.030    Glucose, UA Negative Negative    Ketones, UA Negative Negative    Bilirubin, UA Negative Negative    Blood, UA Negative Negative    Protein, UA Negative Negative    Leuk Esterase, UA Trace (A) Negative    Nitrite, UA Negative Negative    Urobilinogen, UA 0.2 E.U./dL 0.2 - 1.0 E.U./dL   Light Blue Top    Collection Time: 04/20/18  8:34 AM   Result Value Ref Range    Extra Tube hold for add-on    Green Top (Gel)    Collection Time: 04/20/18  8:34 AM   Result Value Ref Range    Extra Tube Hold for add-ons.    Lavender Top    Collection Time: 04/20/18  8:34 AM   Result Value Ref Range    Extra Tube hold for add-on    Gold Top - SST    Collection Time: 04/20/18  8:34 AM   Result Value Ref Range    Extra Tube Hold for add-ons.    CBC Auto Differential    Collection Time: 04/20/18  8:34 AM   Result Value Ref Range    WBC 8.79 3.50 - 10.80 10*3/mm3    RBC 4.82 3.89 - 5.14 10*6/mm3    Hemoglobin 15.2 11.5 - 15.5 g/dL    Hematocrit 44.8 (H) 34.5 - 44.0 %    MCV 92.9 80.0 - 99.0 fL    MCH 31.5 (H) 27.0 - 31.0 pg    MCHC 33.9 32.0 - 36.0 g/dL    RDW 12.8 11.3 - 14.5 %    RDW-SD 43.2 37.0 - 54.0 fl    MPV 10.4 6.0 - 12.0 fL    Platelets 211 150 - 450 10*3/mm3    Neutrophil % 67.9 41.0 - 71.0 %    Lymphocyte % 25.4 24.0 - 44.0 %    Monocyte % 4.4 0.0 - 12.0 %    Eosinophil % 2.0 0.0 - 3.0 %    Basophil % 0.3 0.0 - 1.0 %    Immature Grans % 0.3 0.0 - 0.6 %    Neutrophils, Absolute 5.96 1.50 - 8.30 10*3/mm3    Lymphocytes, Absolute 2.23 0.60 - 4.80  "10*3/mm3    Monocytes, Absolute 0.39 0.00 - 1.00 10*3/mm3    Eosinophils, Absolute 0.18 0.00 - 0.30 10*3/mm3    Basophils, Absolute 0.03 0.00 - 0.20 10*3/mm3    Immature Grans, Absolute 0.03 0.00 - 0.03 10*3/mm3   Urinalysis, Microscopic Only - Urine, Clean Catch    Collection Time: 04/20/18  8:34 AM   Result Value Ref Range    RBC, UA 0-2 None Seen, 0-2 /HPF    WBC, UA 3-5 (A) None Seen, 0-2 /HPF    Bacteria, UA None Seen None Seen, Trace /HPF    Squamous Epithelial Cells, UA 7-12 (A) None Seen, 0-2 /HPF    Hyaline Casts, UA 0-6 0 - 6 /LPF    Methodology Automated Microscopy      Note: In addition to lab results from this visit, the labs listed above may include labs taken at another facility or during a different encounter within the last 24 hours. Please correlate lab times with ED admission and discharge times for further clarification of the services performed during this visit.    CT Abdomen Pelvis Without Contrast   Preliminary Result   No CT evidence of acute intra-abdominal or pelvic   abnormality.       D:  04/20/2018   E:  04/20/2018                    Vitals:    04/20/18 0819 04/20/18 0830 04/20/18 0900   BP: 144/83 138/90 123/80   BP Location: Left arm     Patient Position: Sitting     Pulse: 95 93 99   Resp: 18     Temp: 98.3 °F (36.8 °C)     TempSrc: Oral     SpO2: 100% 98% 97%   Weight: 81.6 kg (180 lb)     Height: 161.3 cm (63.5\")       Medications   sodium chloride 0.9 % flush 10 mL (not administered)     ECG/EMG Results (last 24 hours)     ** No results found for the last 24 hours. **                    [facility]    Final diagnoses:   Acute right flank pain            SARBJIT Schuster  04/20/18 1017    "

## 2018-05-14 ENCOUNTER — OFFICE VISIT (OUTPATIENT)
Dept: GYNECOLOGIC ONCOLOGY | Facility: CLINIC | Age: 38
End: 2018-05-14

## 2018-05-14 ENCOUNTER — OFFICE VISIT (OUTPATIENT)
Dept: PULMONOLOGY | Facility: CLINIC | Age: 38
End: 2018-05-14

## 2018-05-14 VITALS
DIASTOLIC BLOOD PRESSURE: 76 MMHG | RESPIRATION RATE: 16 BRPM | TEMPERATURE: 98.6 F | HEART RATE: 90 BPM | WEIGHT: 180 LBS | BODY MASS INDEX: 31.39 KG/M2 | OXYGEN SATURATION: 98 % | SYSTOLIC BLOOD PRESSURE: 128 MMHG

## 2018-05-14 VITALS
OXYGEN SATURATION: 97 % | TEMPERATURE: 99.5 F | SYSTOLIC BLOOD PRESSURE: 128 MMHG | HEART RATE: 92 BPM | BODY MASS INDEX: 31.16 KG/M2 | WEIGHT: 182.5 LBS | RESPIRATION RATE: 18 BRPM | DIASTOLIC BLOOD PRESSURE: 74 MMHG | HEIGHT: 64 IN

## 2018-05-14 DIAGNOSIS — Z72.0 TOBACCO ABUSE: ICD-10-CM

## 2018-05-14 DIAGNOSIS — L73.9 FOLLICULITIS: Primary | ICD-10-CM

## 2018-05-14 DIAGNOSIS — Z76.89 ESTABLISHING CARE WITH NEW DOCTOR, ENCOUNTER FOR: ICD-10-CM

## 2018-05-14 DIAGNOSIS — J44.9 CHRONIC OBSTRUCTIVE PULMONARY DISEASE, UNSPECIFIED COPD TYPE (HCC): Primary | ICD-10-CM

## 2018-05-14 PROCEDURE — 99214 OFFICE O/P EST MOD 30 MIN: CPT | Performed by: INTERNAL MEDICINE

## 2018-05-14 PROCEDURE — 99213 OFFICE O/P EST LOW 20 MIN: CPT | Performed by: NURSE PRACTITIONER

## 2018-05-14 PROCEDURE — 99407 BEHAV CHNG SMOKING > 10 MIN: CPT | Performed by: INTERNAL MEDICINE

## 2018-05-14 RX ORDER — ERGOCALCIFEROL 1.25 MG/1
50000 CAPSULE ORAL DAILY
COMMUNITY
Start: 2018-04-03 | End: 2018-07-23

## 2018-05-14 RX ORDER — SULFAMETHOXAZOLE AND TRIMETHOPRIM 400; 80 MG/1; MG/1
1 TABLET ORAL DAILY
Qty: 30 TABLET | Refills: 2 | Status: SHIPPED | OUTPATIENT
Start: 2018-05-14 | End: 2019-03-21

## 2018-05-14 RX ORDER — ARIPIPRAZOLE 2 MG/1
2 TABLET ORAL AS NEEDED
COMMUNITY
Start: 2018-02-21 | End: 2018-07-23 | Stop reason: SDDI

## 2018-05-14 RX ORDER — SPIRONOLACTONE 25 MG/1
25 TABLET ORAL 2 TIMES DAILY
Qty: 60 TABLET | Refills: 12 | Status: SHIPPED | OUTPATIENT
Start: 2018-05-14 | End: 2019-03-21 | Stop reason: SINTOL

## 2018-05-14 RX ORDER — MECOBAL/LEVOMEFOLAT CA/B6 PHOS 2-3-35 MG
1 TABLET ORAL DAILY
COMMUNITY
End: 2020-12-18

## 2018-05-14 NOTE — PROGRESS NOTES
GYN ONCOLOGY FOLLOW-UP    Jesica Aguilera  [id number]  1980    Chief Complaint: Follow-up (places come and go)        History of present illness:  Jesica Aguilera is a 37 y.o. year old female who is here today for follow-up for external genital boils. She was started on management with daily low-dose Bactrim antibiotic suppression and spironolactone approx 8 weeks ago. Upon arrival today she states symptoms are not completely resolved, they continue to come and go, but are smaller, less painful, and there are fewer areas. She feels there has been marked improvement since this new treatment is started.     Patient will be due for her annual exam next month. She requests a referral to a new PCP as her previous primary physician is Dr. Herrera, who is going to [facility] this summer.         Past Medical History:   Diagnosis Date   • Acid reflux    • Anxiety    • Arthritis    • Asthma    • Dyspareunia, female    • Fatigue    • Migraines    • Pelvic pain    • Seasonal allergies        Past Surgical History:   Procedure Laterality Date   • HEMORRHOIDECTOMY      anal fissure, sphincter       MEDICATIONS: The current medication list was reviewed and reconciled.     Allergies:  has No Known Allergies.    Family History   Problem Relation Age of Onset   • Hypertension Mother    • Hypertension Father    • No Known Problems Sister    • No Known Problems Brother    • No Known Problems Maternal Grandmother    • No Known Problems Maternal Grandfather    • No Known Problems Paternal Grandmother    • No Known Problems Paternal Grandfather          Review of Systems   Constitutional: Negative for appetite change, chills, fatigue, fever and unexpected weight change.   Respiratory: Negative for cough, shortness of breath and wheezing.    Cardiovascular: Negative for chest pain, palpitations and leg swelling.   Gastrointestinal: Negative for abdominal distention, abdominal pain, blood in stool, constipation, diarrhea,  nausea and vomiting.   Endocrine: Negative.    Genitourinary: Positive for genital sores (frequent boils, improving). Negative for dyspareunia, dysuria, frequency, hematuria, pelvic pain, urgency, vaginal bleeding, vaginal discharge and vaginal pain.   Musculoskeletal: Negative for arthralgias, gait problem and joint swelling.   Neurological: Negative for dizziness, seizures, syncope, weakness, light-headedness, numbness and headaches.   Hematological: Negative for adenopathy.   Psychiatric/Behavioral: Negative.        Physical Exam  Vital Signs: /76   Pulse 90   Temp 98.6 °F (37 °C) (Temporal Artery )   Resp 16   Wt 81.6 kg (180 lb)   SpO2 98%   BMI 31.39 kg/m²    General Appearance:  alert, cooperative, no apparent distress and appears stated age   Neurologic/Psychiatric: A&O x 3, gait steady, appropriate affect   HEENT:  Normocephalic, without obvious abnormality, mucous membranes moist   Abdomen:   Soft, non-tender, non-distended and no organomegaly   Lymph nodes: No cervical, supraclavicular, inguinal or axillary adenopathy noted   Extremities: Normal, atraumatic; no clubbing, cyanosis, or edema    Pelvic: External Genitalia  Marked imporvement of bilateral inner thigh boils noted. One very small area to right side only, size of small pimple. no vulvar lesions or new skin changes noted.   Vagina  is pink, moist, without lesions.   Cervix  normal, without lesions, no discharge, no CMT and IUD strings well visualized  Uterus  normal size, midposition, mobile and nontender  Ovaries  without palpable masses or fullness  Parametria  smooth  Rectovaginal  Female rectovaginal: deferred       Procedure Notes:  No notes on file    Assessment and Plan:    Jesica was seen today for follow-up.    Diagnoses and all orders for this visit:    Folliculitis    Establishing care with new doctor, encounter for  -     Ambulatory Referral to Family Practice    Other orders  -     spironolactone (ALDACTONE) 25 MG  tablet; Take 1 tablet by mouth 2 (Two) Times a Day.  -     sulfamethoxazole-trimethoprim (BACTRIM) 400-80 MG tablet; Take 1 tablet by mouth Daily.        There is marked improvement of folliculitis noted today. Area is soft throughout with no large boils beneath the skin. She is encouraged to continue her current medication regimen. She will be due for annual exam next month, would like to wait until July after her vacation. This will be a good time for us to re-assess treatment and symptoms.     Pt requests referral to new PCP. Referral to Dr. Raya's office ordered today.     Return in about 2 months (around 7/14/2018) for annual exam or PRN.      JAXON Becerra      Note: Speech recognition transcription software was used to dictate portions of this document.  An attempt at proofreading has been made though minor errors in transcription may still be present.  Please do not hesitate to call our office with any questions.

## 2018-05-14 NOTE — PROGRESS NOTES
"Pulmonary Office Follow Up      Subjective   Chief Complaint: Shortness of Breath    Jesica Aguilera is a 37 y.o. female is being seen in follow up for COPD    History of Present Illness    Ms. Aguilera is a 36yo F with a history of tobacco abuse who was initially seen on 1/10/18. At that time, she had PFTs performed which showed moderate airway obstruction. She was started on Stiolto. Her insurance required her to try Anoro which did not work for her and she then resumed the Stiolto.     Since her last visit, she has not had any exacerbations. She only needs to use her rescue inhaler with exercise. Unfortunately, she has not been able to quit smoking. She has previously tried Chantix which worked to quit smoking but exacerbated her Bipolar disorder and she ended up in the Behavioral Unit at [facility]. She has not had any exacerbations since her last visit and she is no longer waking up at night short of breath.     The following portions of the patient's history were reviewed and updated as appropriate: allergies, current medications, past family history, past medical history, past social history, past surgical history and problem list.    Review of Systems   Constitutional: Negative.    HENT: Positive for sneezing.    Eyes: Positive for itching.   Respiratory: Positive for cough and shortness of breath.    Cardiovascular: Negative.    Gastrointestinal: Positive for nausea.   Endocrine: Negative.    Genitourinary: Negative.    Musculoskeletal: Positive for back pain.   Skin: Negative.    Allergic/Immunologic: Negative.    Neurological: Positive for light-headedness and headaches.   Hematological: Negative.    Psychiatric/Behavioral: Positive for dysphoric mood.         Objective   Blood pressure 128/74, pulse 92, temperature 99.5 °F (37.5 °C), resp. rate 18, height 161.3 cm (63.5\"), weight 82.8 kg (182 lb 8 oz), SpO2 97 %.  Physical Exam   Constitutional: She is oriented to person, place, and time. " She appears well-developed and well-nourished. No distress.   HENT:   Head: Normocephalic and atraumatic.   Mouth/Throat: Oropharynx is clear and moist.   Eyes: Conjunctivae are normal. Pupils are equal, round, and reactive to light. No scleral icterus.   Neck: Normal range of motion. Neck supple. No tracheal deviation present. No thyromegaly present.   Cardiovascular: Normal rate, regular rhythm and normal heart sounds.    Pulmonary/Chest: Effort normal and breath sounds normal. No respiratory distress.   Abdominal: Soft. Bowel sounds are normal. There is no tenderness.   Musculoskeletal: Normal range of motion. She exhibits no edema.   Lymphadenopathy:     She has no cervical adenopathy.   Neurological: She is alert and oriented to person, place, and time. She exhibits normal muscle tone. Coordination normal.   Skin: Skin is warm and dry. No rash noted. No erythema.   Psychiatric: She has a normal mood and affect. Her speech is normal and behavior is normal. Judgment normal.   Nursing note and vitals reviewed.      PFTs:  No new PFTs.     Imaging:  No new imaging.     Assessment/Plan   Jesica was seen today for shortness of breath.    Diagnoses and all orders for this visit:    Chronic obstructive pulmonary disease, unspecified COPD type    Tobacco abuse    Other orders  -     tiotropium bromide-olodaterol (STIOLTO RESPIMAT) 2.5-2.5 MCG/ACT aerosol solution inhaler; Inhale 2 puffs Daily.        Discussion:  Ms. Aguilera is a 36yo F who returns to clinic for follow up.     1. Moderate COPD  - Continue Stiolto and PRN Albuterol   - Alpha-1-antitrypsin phenotype is normal.     2. Tobacco Abuse  - > 50% of the visit devoted to tobacco cessation counseling.   - She has picked a new quit date of July.   - She is going to try vaping in order to quit cigarettes. We have discussed that this is not a long term solution as we do not know the long-term effects of vaping. She would also like to try hypnosis if she can afford  it.     Follow up in 4 months.        I personally spent over half of a total 30 minutes face to face with the patient in counseling and discussion and/or coordination of care as described above.     Monica Rasheed, DO  Pulmonary and Critical Care Medicine

## 2018-07-23 ENCOUNTER — OFFICE VISIT (OUTPATIENT)
Dept: FAMILY MEDICINE CLINIC | Facility: CLINIC | Age: 38
End: 2018-07-23

## 2018-07-23 ENCOUNTER — HOSPITAL ENCOUNTER (OUTPATIENT)
Dept: GENERAL RADIOLOGY | Facility: HOSPITAL | Age: 38
Discharge: HOME | End: 2018-07-23
Admitting: PHYSICIAN ASSISTANT

## 2018-07-23 ENCOUNTER — OFFICE VISIT (OUTPATIENT)
Dept: GYNECOLOGIC ONCOLOGY | Facility: CLINIC | Age: 38
End: 2018-07-23

## 2018-07-23 VITALS
WEIGHT: 182 LBS | HEIGHT: 64 IN | TEMPERATURE: 98.2 F | DIASTOLIC BLOOD PRESSURE: 76 MMHG | RESPIRATION RATE: 16 BRPM | SYSTOLIC BLOOD PRESSURE: 115 MMHG | HEART RATE: 97 BPM | BODY MASS INDEX: 31.07 KG/M2 | OXYGEN SATURATION: 98 %

## 2018-07-23 VITALS
BODY MASS INDEX: 31.07 KG/M2 | OXYGEN SATURATION: 99 % | TEMPERATURE: 98.6 F | SYSTOLIC BLOOD PRESSURE: 112 MMHG | DIASTOLIC BLOOD PRESSURE: 78 MMHG | HEIGHT: 64 IN | HEART RATE: 101 BPM | WEIGHT: 182 LBS

## 2018-07-23 DIAGNOSIS — M54.50 BILATERAL LOW BACK PAIN WITHOUT SCIATICA, UNSPECIFIED CHRONICITY: Primary | ICD-10-CM

## 2018-07-23 DIAGNOSIS — G89.29 CHRONIC BILATERAL LOW BACK PAIN WITHOUT SCIATICA: ICD-10-CM

## 2018-07-23 DIAGNOSIS — M54.50 CHRONIC BILATERAL LOW BACK PAIN WITHOUT SCIATICA: ICD-10-CM

## 2018-07-23 DIAGNOSIS — F31.81 BIPOLAR 2 DISORDER (HCC): ICD-10-CM

## 2018-07-23 DIAGNOSIS — K21.9 GASTROESOPHAGEAL REFLUX DISEASE, ESOPHAGITIS PRESENCE NOT SPECIFIED: ICD-10-CM

## 2018-07-23 DIAGNOSIS — Z01.419 WELL WOMAN EXAM WITH ROUTINE GYNECOLOGICAL EXAM: Primary | ICD-10-CM

## 2018-07-23 PROCEDURE — 72100 X-RAY EXAM L-S SPINE 2/3 VWS: CPT

## 2018-07-23 PROCEDURE — 99395 PREV VISIT EST AGE 18-39: CPT | Performed by: NURSE PRACTITIONER

## 2018-07-23 PROCEDURE — 99204 OFFICE O/P NEW MOD 45 MIN: CPT | Performed by: PHYSICIAN ASSISTANT

## 2018-07-23 RX ORDER — LANSOPRAZOLE 30 MG/1
30 CAPSULE, DELAYED RELEASE ORAL DAILY
Qty: 30 CAPSULE | Refills: 11 | Status: SHIPPED | OUTPATIENT
Start: 2018-07-23 | End: 2019-08-17 | Stop reason: SDUPTHER

## 2018-07-23 NOTE — PROGRESS NOTES
GYN ONCOLOGY ANNUAL WELL WOMAN VISIT      Jesica Aguilera  [id number]  1980      Chief Complaint: Annual Exam (c/o low back pain x 2 months, no GYN complaints)        History of present illness:  Jesica Aguilera is a 37 y.o. year old female who is here today for an annual exam. She has had some recent break outs of the folliculitis that she has been seen previously for, but this is overall much improved from last year. She c/o low back pain that is new over the last 2 months. She reports a history of chronic back pain related to possible DDD, but this pain is different. It is described as bilateral, sharp, and burning, that improves with rest. She is scheduled for her new patient appointment with PCP today and plans to discuss these new symptoms. She denies fevers, flank pain, hematuria, or dysuria.   Otherwise, she is feeling generally well. She is happy with her Mirena IUD for contraception. Menses are minimal to absent.       Obstetric History:  OB History      Para Term  AB Living    1 1       1    SAB TAB Ectopic Molar Multiple Live Births                        Menstrual History:     No LMP recorded. Patient has had an implant.          Past Medical History:   Diagnosis Date   • Acid reflux    • Anxiety    • Arthritis    • Asthma    • Bipolar 1 disorder (CMS/HCC)    • COPD (chronic obstructive pulmonary disease) (CMS/HCC)    • Depression    • Dyspareunia, female    • Fatigue    • Migraines    • Pelvic pain    • Seasonal allergies        Past Surgical History:   Procedure Laterality Date   • HEMORRHOIDECTOMY      anal fissure, sphincter       MEDICATIONS: The current medication list was reviewed and reconciled.     Allergies:  has No Known Allergies.    Family History   Problem Relation Age of Onset   • Hypertension Mother    • Obesity Mother    • Hypertension Father    • Arthritis Father    • Polymyalgia rheumatica Father    • Obesity Sister    • No Known Problems Brother    • No Known  "Problems Maternal Grandmother    • Hyperlipidemia Maternal Grandfather    • Hypertension Maternal Grandfather    • No Known Problems Paternal Grandmother    • No Known Problems Paternal Grandfather        Health Maintenance:  Last pap smear was 07/2017, results were  normal PAP.. She  does not have a history of abnormal pap smears.      Review of Systems   Constitutional: Negative for fatigue, fever and unexpected weight change.   HENT: Negative for congestion, ear pain, hearing loss, sinus pressure and trouble swallowing.    Eyes: Negative for visual disturbance.   Respiratory: Negative for cough, chest tightness, shortness of breath and wheezing.    Cardiovascular: Negative for chest pain, palpitations and leg swelling.   Gastrointestinal: Negative for abdominal distention, abdominal pain, constipation, diarrhea, nausea and vomiting.   Endocrine: Negative for cold intolerance, heat intolerance, polydipsia, polyphagia and polyuria.   Genitourinary: Negative for difficulty urinating, dyspareunia, dysuria, frequency, hematuria, pelvic pain, urgency, vaginal bleeding, vaginal discharge and vaginal pain.   Musculoskeletal: Positive for arthralgias and back pain. Negative for gait problem, joint swelling and myalgias.   Skin: Negative for color change, pallor and rash.   Neurological: Negative for dizziness, seizures, syncope, weakness, light-headedness, numbness and headaches.   Hematological: Negative for adenopathy. Does not bruise/bleed easily.   Psychiatric/Behavioral: Negative for agitation, confusion, sleep disturbance and suicidal ideas. The patient is not nervous/anxious.        Physical Exam  Vital Signs: /76   Pulse 97   Temp 98.2 °F (36.8 °C) (Temporal Artery )   Resp 16   Ht 161.3 cm (63.5\")   Wt 82.6 kg (182 lb)   SpO2 98%   BMI 31.73 kg/m²    General Appearance:  alert, cooperative, no apparent distress, appears stated age and obese   Neurologic/Psychiatric: A&O x 3, gait steady, appropriate " affect   HEENT:  Normocephalic, without obvious abnormality, mucous membranes moist   Neck: Supple, symmetrical, trachea midline, no adenopathy;  No thyromegaly, masses, or tenderness   Back:   Symmetric, no curvature, ROM normal, no CVA tenderness   Lungs:   Clear to auscultation bilaterally; respirations regular, even, and unlabored bilaterally   Heart:  Regular rate and rhythm, no murmurs appreciated   Breasts:  Symmetrical, no masses, no lesions and no nipple discharge   Abdomen:   Soft, non-tender, non-distended and no organomegaly   Lymph nodes: No cervical, supraclavicular, inguinal or axillary adenopathy noted   Extremities: Normal, atraumatic; no clubbing, cyanosis, or edema    Skin: No rashes, ulcers, or suspicious lesions noted   Pelvic: External Genitalia  without lesions or skin changes  Vagina  is pink, moist, without lesions.   Cervix  normal, without lesions, no discharge, no CMT, IUD strings well visualized and pap smear obtained  Uterus  normal size, midposition, mobile and nontender  Ovaries  without palpable masses or fullness  Parametria  smooth  Rectovaginal  Female rectovaginal: deferred     ECOG Performance Status: 0 - Asymptomatic    Procedure Note:  No notes on file    Assessment and Plan:    Jesica was seen today for annual exam.    Diagnoses and all orders for this visit:    Well woman exam with routine gynecological exam  -     Pap IG, Rfx HPV ASCU; Future  -     Pap IG, Rfx HPV ASCU        Pap was done today.  Call in 1 week for pap smear results.     She was recommended to begin mammograms at age 40 for breast cancer screening, colonoscopy at age 50 for colon cancer screening and bone density scans (DEXA) at age 60-65 for osteoporosis screening.    We discussed ongoing management of her folliculitis by using Hibiclens wash and bactroban ointment. Scarring has continued to improve on the inner thighs and patient advised to keep up treatment regimen.    Keep appointment with PCP this  afternoon to establish care and discuss back pain.     She was reassured that today everything looked well from a gynecological stand point.     Patient and I discussed that she may be seen in [facility] going forward due to her benign history. The Gyn Oncology office will remain available to her for any complications or concerns. She v/u.     Return to clinic in 1 year for Annual exam or PRN.      Lani Guerrero, JAXON      Note: Speech recognition transcription software was used to dictate portions of this document.  An attempt at proofreading has been made though minor errors in transcription may still be present.  Please do not hesitate to call our office with any questions.

## 2018-07-23 NOTE — PROGRESS NOTES
Subjective   Jesica Aguilera is a 37 y.o. female  Establish Care (New patient establish care, previous PCP Rohit Herrera ); Back Pain (Lower back pain with burning sensation x2 months ); and Anxiety (Req refill on Valium )      History of Present Illness  Patient presents to our office to establish care as a new patient today.  She has been Dr. Herrera patient the past.  She has 3 specific issues to discuss today.  One is back pain that has been in her lower back with a burning sensation for the past 2 months.  She states that she was diagnosed with degenerative disc disease in the low back approximately 10 years ago but this feels different to her.  She denies any history of trauma.  She states she has a burning sensation across the low back midline as well as across the left and right.  She states it does not feel like sciatic it is not going into her buttocks that stays along the low back.  She denies any urinary difficulties.  Second issue is of follow-up on bipolar 2.  She sees a psychiatrist at this time, Dr. Servin who treats her bipolar 2 which has been stable since 2015.  She states that her primary doctor has been the one that fills her Valium, she states she takes this rarely on an as-needed basis, her last prescription was filled in February for 30 tablets.  She states that she has been very stable with this regimen and denies adverse effects medication.  Third issue is acid reflux.  She states as long she takes Prevacid it is stable but if she runs out of this medication she has severe acid reflux and she is requesting a refill.  She sees her gynecologist for well woman visits annually.  She sees a dermatologist for acne which is currently stable medication and sees a pulmonologist for COPD.  The following portions of the patient's history were reviewed and updated as appropriate: allergies, current medications, past social history and problem list    Review of Systems   Constitutional: Negative.   Negative for appetite change and fatigue.   Respiratory: Negative.  Negative for chest tightness and shortness of breath.    Gastrointestinal: Negative.  Negative for abdominal pain, diarrhea and nausea.   Genitourinary: Negative.    Musculoskeletal: Positive for back pain. Negative for arthralgias, gait problem and myalgias.   Neurological: Negative for dizziness, tremors, weakness, light-headedness, numbness and headaches.   Psychiatric/Behavioral: Negative for agitation, behavioral problems, confusion, decreased concentration, dysphoric mood, sleep disturbance and suicidal ideas. The patient is nervous/anxious ( Occasionally, stable on meds).        Objective     Vitals:    07/23/18 1302   BP: 112/78   Pulse: 101   Temp: 98.6 °F (37 °C)   SpO2: 99%       Physical Exam   Constitutional: She is oriented to person, place, and time. She appears well-developed and well-nourished. No distress.   HENT:   Head: Normocephalic and atraumatic.   Eyes: Pupils are equal, round, and reactive to light. Conjunctivae and EOM are normal.   Neck: Normal range of motion. Neck supple. No thyroid mass and no thyromegaly present.   Cardiovascular: Normal rate, regular rhythm and normal heart sounds.    Pulmonary/Chest: Effort normal and breath sounds normal.   Abdominal: Soft. Bowel sounds are normal.   Musculoskeletal: She exhibits tenderness ( Mild tenderness along L5-S1 region midline).        Lumbar back: She exhibits decreased range of motion, tenderness, bony tenderness and pain. She exhibits no swelling, no deformity and no spasm.   Neurological: She is alert and oriented to person, place, and time. She has normal reflexes. She displays normal reflexes. No cranial nerve deficit or sensory deficit. She exhibits normal muscle tone. Coordination normal.   Skin: No rash noted. She is not diaphoretic. No erythema. No pallor.   Psychiatric: Her speech is normal and behavior is normal. Judgment and thought content normal. Her mood  appears anxious ( Mildly anxious). Her affect is not angry and not inappropriate. Cognition and memory are normal. She does not exhibit a depressed mood.   Pleasant, well-groomed, conversational She is attentive.   Nursing note and vitals reviewed.      Assessment/Plan     Diagnoses and all orders for this visit:    Bilateral low back pain without sciatica, unspecified chronicity  -     XR Spine Lumbar 2 or 3 View; Future    Gastroesophageal reflux disease, esophagitis presence not specified  -     lansoprazole (PREVACID) 30 MG capsule; Take 1 capsule by mouth Daily.    Bipolar 2 disorder (CMS/[facility])    Prescription written for Valium 5 mg one daily as needed for panic attacks associated with bipolar disorder #30 with 2 refills, follow-up in office for recheck in 3 months, controlled substance agreement reviewed and signed, discussed abuse potential is medication, Oc reviewed, appropriate.  Continue following up with psychiatrist, will send report of x-ray results after reviewed, will consider referral to physical therapy, low back pain most consistent with musculoskeletal in origin.

## 2018-09-05 NOTE — TELEPHONE ENCOUNTER
Pt called and requesting samples of Rx Stiolto Respimat 2.5 mcg. Pt informed that samples of Rx Stiolto Respimat will be placed up front. Pt verbalized understanding and appreciation.

## 2019-03-21 ENCOUNTER — OFFICE VISIT (OUTPATIENT)
Dept: PULMONOLOGY | Facility: CLINIC | Age: 39
End: 2019-03-21

## 2019-03-21 VITALS
DIASTOLIC BLOOD PRESSURE: 82 MMHG | OXYGEN SATURATION: 97 % | HEIGHT: 64 IN | BODY MASS INDEX: 29.56 KG/M2 | RESPIRATION RATE: 18 BRPM | TEMPERATURE: 97.4 F | WEIGHT: 173.13 LBS | SYSTOLIC BLOOD PRESSURE: 120 MMHG | HEART RATE: 90 BPM

## 2019-03-21 DIAGNOSIS — Z72.0 TOBACCO ABUSE: ICD-10-CM

## 2019-03-21 DIAGNOSIS — J44.9 CHRONIC OBSTRUCTIVE PULMONARY DISEASE, UNSPECIFIED COPD TYPE (HCC): Primary | ICD-10-CM

## 2019-03-21 PROCEDURE — 99407 BEHAV CHNG SMOKING > 10 MIN: CPT | Performed by: INTERNAL MEDICINE

## 2019-03-21 PROCEDURE — 99214 OFFICE O/P EST MOD 30 MIN: CPT | Performed by: INTERNAL MEDICINE

## 2019-03-21 RX ORDER — FLUTICASONE PROPIONATE 50 MCG
SPRAY, SUSPENSION (ML) NASAL
COMMUNITY

## 2019-03-21 RX ORDER — OMEPRAZOLE 20 MG/1
CAPSULE, DELAYED RELEASE ORAL
COMMUNITY
End: 2019-08-21

## 2019-03-21 RX ORDER — LORATADINE 10 MG/1
TABLET ORAL
COMMUNITY
End: 2020-07-31

## 2019-03-21 RX ORDER — VARENICLINE TARTRATE 1 MG/1
1 TABLET, FILM COATED ORAL 2 TIMES DAILY
Qty: 60 TABLET | Refills: 4 | Status: SHIPPED | OUTPATIENT
Start: 2019-03-21 | End: 2020-03-25

## 2019-03-21 NOTE — PROGRESS NOTES
Pulmonary Office Follow Up      Subjective   Chief Complaint: Shortness of Breath    Jesica Aguilera is a 38 y.o. female is being seen in follow up for COPD    History of Present Illness    Ms. Aguilera is a 39yo F with a history of tobacco abuse who was initially seen on 1/10/18. At that time, she had PFTs performed which showed moderate airway obstruction. She was started on Stiolto. Her insurance required her to try Anoro which did not work for her and she then resumed the Stiolto.     She has not been seen in clinic since 5/14/18. Since that time, she has continued to smoke. She would really like to attempt to quit again. In the past, she tried Chantix but ended up in the hospital due to an interaction with her Lamictal. She is no longer taking Lamictal. She has discussed possible trying Chantix again with her therapist. She remains short of breath. She was taking Stiolto but ran out of samples and her PA for her Rx was no longer valid. She has not been on inhaler therapy. She mainly notices shortness of breath with exertion such as climbing stairs. Her breathing usually improves with rest.       The following portions of the patient's history were reviewed and updated as appropriate: allergies, current medications, past family history, past medical history, past social history, past surgical history and problem list.    Review of Systems   Constitutional: Negative.    HENT: Positive for ear pain.    Eyes: Positive for itching.   Respiratory: Positive for chest tightness, shortness of breath and wheezing.    Cardiovascular: Negative.    Gastrointestinal: Negative.    Endocrine: Negative.    Genitourinary: Negative.    Musculoskeletal: Negative.    Skin: Negative.    Allergic/Immunologic: Negative.    Neurological: Positive for light-headedness and headaches.   Hematological: Negative.    Psychiatric/Behavioral: Negative.          Objective   Blood pressure 120/82, pulse 90, temperature 97.4 °F (36.3 °C), resp.  "rate 18, height 161.3 cm (63.5\"), weight 78.5 kg (173 lb 2 oz), SpO2 97 %.  Physical Exam   Constitutional: She is oriented to person, place, and time. She appears well-developed and well-nourished. No distress.   HENT:   Head: Normocephalic and atraumatic.   Mouth/Throat: Oropharynx is clear and moist.   Eyes: Conjunctivae are normal. Pupils are equal, round, and reactive to light. No scleral icterus.   Neck: Normal range of motion. Neck supple. No tracheal deviation present. No thyromegaly present.   Cardiovascular: Normal rate, regular rhythm and normal heart sounds.   Pulmonary/Chest: Effort normal and breath sounds normal. No respiratory distress.   Abdominal: Soft. Bowel sounds are normal. There is no tenderness.   Musculoskeletal: Normal range of motion. She exhibits no edema.   Lymphadenopathy:     She has no cervical adenopathy.   Neurological: She is alert and oriented to person, place, and time. She exhibits normal muscle tone. Coordination normal.   Skin: Skin is warm and dry. No rash noted. No erythema.   Psychiatric: She has a normal mood and affect. Her speech is normal and behavior is normal. Judgment normal.   Nursing note and vitals reviewed.      PFTs:  No new PFTs.     Imaging:  No new imaging.     Assessment/Plan   Jesica was seen today for copd, shortness of breath, chest tightness and nicotine dependence.    Diagnoses and all orders for this visit:    Chronic obstructive pulmonary disease, unspecified COPD type (CMS/[facility])    Tobacco abuse    Other orders  -     tiotropium bromide-olodaterol (STIOLTO RESPIMAT) 2.5-2.5 MCG/ACT aerosol solution inhaler; Inhale 2 puffs Daily.  -     varenicline (CHANTIX MARCELINO) 0.5 MG X 11 & 1 MG X 42 tablet; Use as directed on package instructions, try to quit smoking after 1 week  -     varenicline (CHANTIX) 1 MG tablet; Take 1 tablet by mouth 2 (Two) Times a Day.        Discussion:  Ms. Aguilera is a 39yo F who returns to clinic for follow up.     1. Moderate " COPD  - Continue Stiolto and PRN Albuterol   - Samples for Stiolto given in clinic and new prescription sent. She will likely need a PA. She has failed Anoro in the past.   - Alpha-1-antitrypsin phenotype is normal.     2. Tobacco Abuse  - 15 minutes devoted to smoking cessation counseling.   - She wants to try Chantix again. We discussed that if she has any alterations in her mood, she must discontinue the medication immediately and seek help. Rx sent for starter pack and continuation pack.   - Suggested she see if her local health department has any smoking cessation classes.   - She plans to start her quit attempt tomorrow.     Follow up in 6 months.        I personally spent over half of a total 30 minutes face to face with the patient in counseling and discussion and/or coordination of care as described above.     Monica OSWLAD Case, DO  Pulmonary and Critical Care Medicine  Note electronically signed

## 2019-03-22 ENCOUNTER — TELEPHONE (OUTPATIENT)
Dept: PULMONOLOGY | Facility: CLINIC | Age: 39
End: 2019-03-22

## 2019-03-22 NOTE — TELEPHONE ENCOUNTER
Pa approval received from [facility] approved from 02/20/2019 03/22/2020 pa#[id number] ALLAN

## 2019-04-26 ENCOUNTER — OFFICE VISIT (OUTPATIENT)
Dept: FAMILY MEDICINE CLINIC | Facility: CLINIC | Age: 39
End: 2019-04-26

## 2019-04-26 VITALS
BODY MASS INDEX: 30.05 KG/M2 | OXYGEN SATURATION: 99 % | WEIGHT: 176 LBS | TEMPERATURE: 98.4 F | SYSTOLIC BLOOD PRESSURE: 130 MMHG | HEART RATE: 89 BPM | DIASTOLIC BLOOD PRESSURE: 78 MMHG | HEIGHT: 64 IN

## 2019-04-26 DIAGNOSIS — J32.9 SINUSITIS, UNSPECIFIED CHRONICITY, UNSPECIFIED LOCATION: ICD-10-CM

## 2019-04-26 DIAGNOSIS — F41.9 ANXIETY: ICD-10-CM

## 2019-04-26 DIAGNOSIS — M54.5 LOW BACK PAIN, UNSPECIFIED BACK PAIN LATERALITY, UNSPECIFIED CHRONICITY, WITH SCIATICA PRESENCE UNSPECIFIED: Primary | ICD-10-CM

## 2019-04-26 LAB
BILIRUB BLD-MCNC: NEGATIVE MG/DL
CLARITY, POC: CLEAR
COLOR UR: YELLOW
GLUCOSE UR STRIP-MCNC: NEGATIVE MG/DL
KETONES UR QL: NEGATIVE
LEUKOCYTE EST, POC: NEGATIVE
NITRITE UR-MCNC: NEGATIVE MG/ML
PH UR: 6 [PH] (ref 5–8)
PROT UR STRIP-MCNC: NEGATIVE MG/DL
RBC # UR STRIP: NEGATIVE /UL
SP GR UR: 1.02 (ref 1–1.03)
UROBILINOGEN UR QL: NORMAL

## 2019-04-26 PROCEDURE — 99214 OFFICE O/P EST MOD 30 MIN: CPT | Performed by: PHYSICIAN ASSISTANT

## 2019-04-26 PROCEDURE — 81003 URINALYSIS AUTO W/O SCOPE: CPT | Performed by: PHYSICIAN ASSISTANT

## 2019-04-26 RX ORDER — CEFUROXIME AXETIL 250 MG/1
250 TABLET ORAL 2 TIMES DAILY
Qty: 20 TABLET | Refills: 0 | Status: SHIPPED | OUTPATIENT
Start: 2019-04-26 | End: 2019-07-26

## 2019-04-26 RX ORDER — PREDNISONE 20 MG/1
20 TABLET ORAL 2 TIMES DAILY
Qty: 10 TABLET | Refills: 0 | Status: SHIPPED | OUTPATIENT
Start: 2019-04-26 | End: 2019-07-26

## 2019-04-26 NOTE — PROGRESS NOTES
Subjective   Jesica Aguilera is a 38 y.o. female  Anxiety (Follow up on anxiety, req refill on Valium ); Sinus Problem (sinus pressure/pain x4 days ); Earache (bilateral ear pain ); and Back Pain (low back pain x1 days )      History of Present Illness  Patient comes in for 3 separate issues once for follow-up on generalized anxiety disorder stable taking Valium on an as-needed basis, she has been having trouble with earache and sinus pressure for the past 4 days and low back pain since yesterday.  The following portions of the patient's history were reviewed and updated as appropriate: allergies, current medications, past social history and problem list    Review of Systems   Constitutional: Negative.  Negative for chills, fatigue and fever.   HENT: Positive for congestion, ear pain, postnasal drip, rhinorrhea and sinus pressure. Negative for sore throat.    Eyes: Negative for pain.   Respiratory: Negative.  Negative for shortness of breath.    Gastrointestinal: Negative.    Genitourinary: Negative.    Musculoskeletal: Positive for back pain. Negative for arthralgias, gait problem and myalgias.   Neurological: Positive for headaches. Negative for dizziness, tremors, weakness and numbness.   Hematological: Negative for adenopathy.   Psychiatric/Behavioral: Negative for dysphoric mood. The patient is nervous/anxious ( Stable, taking Valium as needed.).        Objective     Vitals:    04/26/19 1458   BP: 130/78   Pulse: 89   Temp: 98.4 °F (36.9 °C)   SpO2: 99%       Physical Exam   Constitutional: She is oriented to person, place, and time. She appears well-developed and well-nourished. No distress.   HENT:   Head: Normocephalic and atraumatic.   Right Ear: Tympanic membrane and ear canal normal.   Left Ear: Tympanic membrane and ear canal normal.   Nose: Mucosal edema, rhinorrhea and sinus tenderness present. Right sinus exhibits maxillary sinus tenderness and frontal sinus tenderness. Left sinus exhibits maxillary  sinus tenderness and frontal sinus tenderness.   Mouth/Throat: Oropharynx is clear and moist. No oropharyngeal exudate.   Eyes: Pupils are equal, round, and reactive to light.   Neck: No thyroid mass and no thyromegaly present.   Cardiovascular: Normal rate, regular rhythm and normal heart sounds.   Pulmonary/Chest: Effort normal and breath sounds normal. No respiratory distress.   Musculoskeletal: Normal range of motion. She exhibits no edema, tenderness or deformity.   Neurological: She is alert and oriented to person, place, and time. She displays normal reflexes. No cranial nerve deficit or sensory deficit. She exhibits normal muscle tone. Coordination normal.   Skin: Skin is warm and dry. No rash noted. She is not diaphoretic. No pallor.   Psychiatric: She has a normal mood and affect. Her speech is normal and behavior is normal. Judgment and thought content normal. Her mood appears not anxious. Her affect is not angry and not inappropriate. Cognition and memory are normal. She does not exhibit a depressed mood. She is attentive.   Nursing note and vitals reviewed.      Assessment/Plan     Diagnoses and all orders for this visit:    Low back pain, unspecified back pain laterality, unspecified chronicity, with sciatica presence unspecified  -     POC Urinalysis Dipstick, Automated    Sinusitis, unspecified chronicity, unspecified location    Anxiety    Other orders  -     predniSONE (DELTASONE) 20 MG tablet; Take 1 tablet by mouth 2 (Two) Times a Day.  -     cefuroxime (CEFTIN) 250 MG tablet; Take 1 tablet by mouth 2 (Two) Times a Day.    Reviewed urinalysis normal, low back pain most likely muscular secondary to cough from sinusitis.  Follow-up for further evaluation if symptoms persist.

## 2019-05-02 ENCOUNTER — TELEPHONE (OUTPATIENT)
Dept: FAMILY MEDICINE CLINIC | Facility: CLINIC | Age: 39
End: 2019-05-02

## 2019-05-02 ENCOUNTER — LAB (OUTPATIENT)
Dept: LAB | Facility: HOSPITAL | Age: 39
End: 2019-05-02

## 2019-05-02 ENCOUNTER — HOSPITAL ENCOUNTER (OUTPATIENT)
Dept: GENERAL RADIOLOGY | Facility: HOSPITAL | Age: 39
Discharge: HOME | End: 2019-05-02
Admitting: PHYSICIAN ASSISTANT

## 2019-05-02 DIAGNOSIS — R50.9 FEVER, UNSPECIFIED FEVER CAUSE: ICD-10-CM

## 2019-05-02 DIAGNOSIS — R05.9 COUGH: ICD-10-CM

## 2019-05-02 DIAGNOSIS — R05.9 COUGH: Primary | ICD-10-CM

## 2019-05-02 LAB
BASOPHILS # BLD AUTO: 0.08 10*3/MM3 (ref 0–0.2)
BASOPHILS NFR BLD AUTO: 0.7 % (ref 0–1.5)
DEPRECATED RDW RBC AUTO: 39.3 FL (ref 37–54)
EOSINOPHIL # BLD AUTO: 0.25 10*3/MM3 (ref 0–0.4)
EOSINOPHIL NFR BLD AUTO: 2.2 % (ref 0.3–6.2)
ERYTHROCYTE [DISTWIDTH] IN BLOOD BY AUTOMATED COUNT: 11.5 % (ref 12.3–15.4)
HCT VFR BLD AUTO: 44.7 % (ref 34–46.6)
HGB BLD-MCNC: 14.8 G/DL (ref 12–15.9)
IMM GRANULOCYTES # BLD AUTO: 0.36 10*3/MM3 (ref 0–0.05)
IMM GRANULOCYTES NFR BLD AUTO: 3.1 % (ref 0–0.5)
LYMPHOCYTES # BLD AUTO: 3.38 10*3/MM3 (ref 0.7–3.1)
LYMPHOCYTES NFR BLD AUTO: 29.4 % (ref 19.6–45.3)
MCH RBC QN AUTO: 30.7 PG (ref 26.6–33)
MCHC RBC AUTO-ENTMCNC: 33.1 G/DL (ref 31.5–35.7)
MCV RBC AUTO: 92.7 FL (ref 79–97)
MONOCYTES # BLD AUTO: 0.8 10*3/MM3 (ref 0.1–0.9)
MONOCYTES NFR BLD AUTO: 7 % (ref 5–12)
NEUTROPHILS # BLD AUTO: 6.61 10*3/MM3 (ref 1.7–7)
NEUTROPHILS NFR BLD AUTO: 57.6 % (ref 42.7–76)
NRBC BLD AUTO-RTO: 0 /100 WBC (ref 0–0.2)
PLATELET # BLD AUTO: 247 10*3/MM3 (ref 140–450)
PMV BLD AUTO: 11 FL (ref 6–12)
RBC # BLD AUTO: 4.82 10*6/MM3 (ref 3.77–5.28)
WBC NRBC COR # BLD: 11.48 10*3/MM3 (ref 3.4–10.8)

## 2019-05-02 PROCEDURE — 71046 X-RAY EXAM CHEST 2 VIEWS: CPT

## 2019-05-02 PROCEDURE — 85025 COMPLETE CBC W/AUTO DIFF WBC: CPT

## 2019-05-02 PROCEDURE — 36415 COLL VENOUS BLD VENIPUNCTURE: CPT

## 2019-05-02 NOTE — TELEPHONE ENCOUNTER
"----- Message from Mary Jo Durán sent at 5/2/2019 10:46 AM EDT -----  Contact: PT.  PT. SEE'S JAC[facility].  PT. WAS SEEN LAST Friday FOR A SEVERE BRONCHIAL/SINUS INFECTION.  FLUID STILL IN EARS; PT. FEELING LIKE SHE WAS \"HIT WITH A BASEBALL BAT\"; HAS ALL OVER BODY ACHES ON UPPER TORSO REGIONS.    PT. CAN BE REACHED @ [facility].    RX=[facility]  "

## 2019-05-02 NOTE — TELEPHONE ENCOUNTER
Does patient feel like her symptoms are all in her sinuses?  She was having back pain when she was here is her back pain worse?

## 2019-05-02 NOTE — TELEPHONE ENCOUNTER
"Patient states her sinus are better, she is having some upper respitiory issues that have not completely resolved. She's had increased dizziness and was very shaky today. She feels like she has a \" bruising pain' all over her body. Her body is feeling very sore and sensitive.  "

## 2019-05-02 NOTE — TELEPHONE ENCOUNTER
I have placed an order for chest x-ray and blood work I want patient to go by the hospital or 1 of our labs today or this evening and have this done then I want to see her in the office tomorrow.

## 2019-05-03 ENCOUNTER — LAB (OUTPATIENT)
Dept: LAB | Facility: HOSPITAL | Age: 39
End: 2019-05-03

## 2019-05-03 ENCOUNTER — OFFICE VISIT (OUTPATIENT)
Dept: FAMILY MEDICINE CLINIC | Facility: CLINIC | Age: 39
End: 2019-05-03

## 2019-05-03 ENCOUNTER — TELEPHONE (OUTPATIENT)
Dept: FAMILY MEDICINE CLINIC | Facility: CLINIC | Age: 39
End: 2019-05-03

## 2019-05-03 VITALS
WEIGHT: 181 LBS | HEIGHT: 64 IN | HEART RATE: 105 BPM | TEMPERATURE: 99 F | DIASTOLIC BLOOD PRESSURE: 66 MMHG | SYSTOLIC BLOOD PRESSURE: 108 MMHG | OXYGEN SATURATION: 98 % | BODY MASS INDEX: 30.9 KG/M2

## 2019-05-03 DIAGNOSIS — R07.9 CHEST PAIN, UNSPECIFIED TYPE: ICD-10-CM

## 2019-05-03 DIAGNOSIS — J44.9 CHRONIC OBSTRUCTIVE PULMONARY DISEASE, UNSPECIFIED COPD TYPE (HCC): ICD-10-CM

## 2019-05-03 DIAGNOSIS — R55 NEAR SYNCOPE: ICD-10-CM

## 2019-05-03 DIAGNOSIS — R53.83 FATIGUE, UNSPECIFIED TYPE: ICD-10-CM

## 2019-05-03 DIAGNOSIS — M79.10 MUSCLE PAIN: ICD-10-CM

## 2019-05-03 DIAGNOSIS — R55 NEAR SYNCOPE: Primary | ICD-10-CM

## 2019-05-03 DIAGNOSIS — M54.6 ACUTE THORACIC BACK PAIN, UNSPECIFIED BACK PAIN LATERALITY: ICD-10-CM

## 2019-05-03 LAB
ALBUMIN SERPL-MCNC: 3.8 G/DL (ref 3.5–5.2)
ALBUMIN/GLOB SERPL: 1.5 G/DL
ALP SERPL-CCNC: 56 U/L (ref 39–117)
ALT SERPL W P-5'-P-CCNC: 11 U/L (ref 1–33)
ANION GAP SERPL CALCULATED.3IONS-SCNC: 9.2 MMOL/L
AST SERPL-CCNC: 16 U/L (ref 1–32)
BILIRUB SERPL-MCNC: 0.4 MG/DL (ref 0.2–1.2)
BUN BLD-MCNC: 12 MG/DL (ref 6–20)
BUN/CREAT SERPL: 12.8 (ref 7–25)
CALCIUM SPEC-SCNC: 8.4 MG/DL (ref 8.6–10.5)
CHLORIDE SERPL-SCNC: 101 MMOL/L (ref 98–107)
CK SERPL-CCNC: 38 U/L (ref 20–180)
CO2 SERPL-SCNC: 27.8 MMOL/L (ref 22–29)
CREAT BLD-MCNC: 0.94 MG/DL (ref 0.57–1)
GFR SERPL CREATININE-BSD FRML MDRD: 67 ML/MIN/1.73
GLOBULIN UR ELPH-MCNC: 2.5 GM/DL
GLUCOSE BLD-MCNC: 86 MG/DL (ref 65–99)
POTASSIUM BLD-SCNC: 4.1 MMOL/L (ref 3.5–5.2)
PROT SERPL-MCNC: 6.3 G/DL (ref 6–8.5)
SODIUM BLD-SCNC: 138 MMOL/L (ref 136–145)

## 2019-05-03 PROCEDURE — 99214 OFFICE O/P EST MOD 30 MIN: CPT | Performed by: PHYSICIAN ASSISTANT

## 2019-05-03 PROCEDURE — 82550 ASSAY OF CK (CPK): CPT

## 2019-05-03 PROCEDURE — 80053 COMPREHEN METABOLIC PANEL: CPT

## 2019-05-03 RX ORDER — PROMETHAZINE HYDROCHLORIDE AND CODEINE PHOSPHATE 6.25; 1 MG/5ML; MG/5ML
SOLUTION ORAL
COMMUNITY
Start: 2019-04-26 | End: 2019-07-26

## 2019-05-03 NOTE — PROGRESS NOTES
Subjective   Jesica Aguilera is a 38 y.o. female  URI (has multiple sx.  CXR and Bloodwork is done, finished prednisone, still on ABX)      History of Present Illness  Patient comes in today concerned with new onset worsening symptoms since her last visit.  She states that she was feeling when she was getting better from her respiratory infection she feels that her COPD is stable.  However she states that yesterday she started having sudden onset discomfort over her chest and her back.  She states she feels excessively tender to touch over her chest and her upper back.  She denies any rash.  No fever.  States her cough had improved and she denies any significant increase in shortness of breath.  Denies symptoms been exertional she states is all related to touch.  She also has some soreness in her legs.  Chest x-ray is pending from yesterday, CBC is essentially normal.  The following portions of the patient's history were reviewed and updated as appropriate: allergies, current medications, past social history and problem list    Review of Systems   Constitutional: Positive for fatigue. Negative for fever.   HENT: Negative for congestion and trouble swallowing.    Respiratory: Negative.  Negative for cough, choking and shortness of breath.    Cardiovascular: Positive for chest pain. Negative for palpitations and leg swelling.   Gastrointestinal: Negative.  Negative for abdominal distention, abdominal pain and nausea.   Genitourinary: Negative.    Musculoskeletal: Positive for back pain and myalgias. Negative for arthralgias and gait problem.   Neurological: Negative for dizziness, tremors, syncope, weakness and numbness.        Patient states yesterday she started feeling very lightheaded for like she was going to pass out but did not   Hematological: Negative.    Psychiatric/Behavioral: Negative for dysphoric mood. The patient is not nervous/anxious.        Objective     Vitals:    05/03/19 0950   BP: 108/66    Pulse: 105   Temp: 99 °F (37.2 °C)   SpO2: 98%       Physical Exam   Constitutional: She is oriented to person, place, and time. She appears well-developed and well-nourished. No distress.   HENT:   Head: Normocephalic and atraumatic.   Right Ear: External ear normal.   Left Ear: External ear normal.   Nose: Nose normal.   Mouth/Throat: Oropharynx is clear and moist. No oropharyngeal exudate.   Eyes: Conjunctivae are normal. Right eye exhibits no discharge. Left eye exhibits no discharge.   Neck: Normal range of motion. Neck supple. No JVD present.   Cardiovascular: Normal rate, regular rhythm, normal heart sounds and intact distal pulses.   No murmur heard.  Pulmonary/Chest: Effort normal and breath sounds normal. No respiratory distress. She exhibits no tenderness.   Abdominal: Soft. She exhibits no distension. There is no tenderness.   Musculoskeletal: She exhibits tenderness ( Marked tenderness to very light touch over entire chest region and thoracic upper back region to the shoulders). She exhibits no edema.   Lymphadenopathy:     She has no cervical adenopathy.   Neurological: She is alert and oriented to person, place, and time. She displays normal reflexes. A sensory deficit ( Excessively sensitive to light touch over torso) is present. No cranial nerve deficit. She exhibits normal muscle tone. Coordination normal.   Skin: Skin is warm and dry. No rash noted. She is not diaphoretic. No erythema. No pallor.   Psychiatric: She has a normal mood and affect. Her behavior is normal. Judgment and thought content normal.   Nursing note and vitals reviewed.      Assessment/Plan     Diagnoses and all orders for this visit:    Near syncope  -     Comprehensive metabolic panel; Future  -     CK; Future    Fatigue, unspecified type  -     Comprehensive metabolic panel; Future  -     CK; Future    Muscle pain  -     Comprehensive metabolic panel; Future  -     CK; Future    Chronic obstructive pulmonary disease,  unspecified COPD type (CMS/HCC)    Chest pain, unspecified type    Acute thoracic back pain, unspecified back pain laterality    Other orders  -     promethazine-codeine (PHENERGAN with CODEINE) 6.25-10 MG/5ML solution    I will contact patient later today regarding the results of her chest x-ray that was done yesterday and her labs were drawn today.  I did review her CBC with her and informed her that it was essentially normal.

## 2019-05-03 NOTE — TELEPHONE ENCOUNTER
PT WAS TOLD TO CALLBACK AROUND 1 ABOUT HER LAB RESULTS SO SHE KNOWS WHETHER SHE NEEDS TO GET A CT OR NOT.

## 2019-05-03 NOTE — TELEPHONE ENCOUNTER
Please notify patient that her chest x-ray is normal, shows good clear appearance to the lungs and vascular structures, does not need CT scan, labs appear to show signs of inflammation I have sent in a prescription of a anti-inflammatory that I want her to take 3 times a day for the next 5 days and let me know if that does not clear up her symptoms.  Have her make sure to hydrate well through the weekend.

## 2019-07-11 NOTE — TELEPHONE ENCOUNTER
Pt called requesting to send in Rx Chantix starter celia to [facility] to restart prescription. Okayed with Dr. Rasheed to send in Rx Chantix. Pt notified and verbalized appreciation.

## 2019-07-25 PROBLEM — Z01.419 WELL WOMAN EXAM: Status: ACTIVE | Noted: 2019-07-25

## 2019-07-26 ENCOUNTER — OFFICE VISIT (OUTPATIENT)
Dept: OBSTETRICS AND GYNECOLOGY | Facility: CLINIC | Age: 39
End: 2019-07-26

## 2019-07-26 VITALS
WEIGHT: 186.4 LBS | DIASTOLIC BLOOD PRESSURE: 64 MMHG | SYSTOLIC BLOOD PRESSURE: 100 MMHG | BODY MASS INDEX: 31.82 KG/M2 | HEIGHT: 64 IN

## 2019-07-26 DIAGNOSIS — Z01.419 WELL WOMAN EXAM: Primary | ICD-10-CM

## 2019-07-26 PROCEDURE — 99395 PREV VISIT EST AGE 18-39: CPT | Performed by: OBSTETRICS & GYNECOLOGY

## 2019-07-26 RX ORDER — HEPATITIS A VACCINE, INACTIVATED 50 [IU]/ML
INJECTION, SUSPENSION INTRAMUSCULAR
COMMUNITY
Start: 2019-06-14

## 2019-07-26 NOTE — PROGRESS NOTES
Subjective   Chief Complaint   Patient presents with   • Gynecologic Exam     New pt to [facility]. [facility] pt. pt here for annual. last pap 18 negative     Jesica Aguilera is a 38 y.o. year old  presenting to be seen for her annual exam.     SEXUAL Hx:  She is currently sexually active.  In the past year there there has been ONE new sexual partner.    Condoms are never used.  She would like to be screened for STD's at today's exam.  Current birth control method: IUD - Mirena.  She is happy with her current method of contraception and does not want to discuss alternative methods of contraception.  MENSTRUAL Hx:  No LMP recorded. Patient has had an implant.   Mirena 17  No periods   Intermenstrual bleeding is absent.    Post-coital bleeding is absent.  Dysmenorrhea: none  PMS: none  Her cycles are not a source of concern for her that she wishes to discuss today.  HEALTH Hx:  She exercises regularly: yes, walks 30 minutes a day 5 times a week   She wears her seat belt: yes.  She has concerns about domestic violence: no.  OTHER THINGS SHE WANTS TO DISCUSS TODAY:  Nothing else    The following portions of the patient's history were reviewed and updated as appropriate:problem list, current medications, allergies, past family history, past medical history, past social history and past surgical history.    Social History    Tobacco Use      Smoking status: Current Every Day Smoker        Packs/day: 1.00        Years: 24.00        Pack years: 24        Types: Cigarettes        Start date:     Review of Systems  Constitutional POS: nothing reported    NEG: anorexia or night sweats   Genitourinary POS: nothing reported    NEG: dysuria or hematuria      Gastointestinal POS: constipation (chronic)    NEG: bloating, change in bowel habits, melena or reflux symptoms   Integument POS: nothing reported    NEG: moles that are changing in size, shape, color or rashes   Breast POS: nothing reported    NEG: persistent  "breast lump, skin dimpling or nipple discharge        Objective   /64   Ht 161.3 cm (63.5\")   Wt 84.6 kg (186 lb 6.4 oz)   Breastfeeding? No   BMI 32.50 kg/m²     General:  well developed; well nourished  no acute distress   Skin:  No suspicious lesions seen   Thyroid: normal to inspection and palpation   Breasts:  Examined in supine position  Symmetric without masses or skin dimpling  Nipples normal without inversion, lesions or discharge  There are no palpable axillary nodes   Nipple piercing present    Abdomen: soft, non-tender; no masses  no umbilical or inguinal hernias are present  no hepato-splenomegaly   Pelvis: Clinical staff was present for exam  External genitalia:  normal appearance of the external genitalia including Bartholin's and [place] Cliff's glands.  :  urethral meatus normal;  Vaginal:  normal pink mucosa without prolapse or lesions.  Cervix:  normal appearance.  Uterus:  normal size, shape and consistency.  Adnexa:  normal bimanual exam of the adnexa.        Assessment   1. Normal GYN exam  2. STI screening      Plan   1. Pap was not done today.  I explained to Jesica that the recommendations for Pap smear interval in a low risk patient has lengthened to 3 years time.  I told Jesica she still needs to be seen in our office yearly for a full physical including breast and pelvic exam.  2. The following tests were ordered today: STD swabs for GC, chlamydia and trichimoniasis.  It was explained to Jesica that all lab test should be back within the one week after they are performed. She will be notified about the results, regardless of the findings. If she has not been contacted by the office within 2 weeks after the test has been performed, it is her responsibility to contact us to learn about her results.  3. No prescription was given or electronically sent at today's visit  4. The importance of keeping all planned follow-up and taking all medications as prescribed was " emphasized.  5. Follow up for annual exam in one year    No orders of the defined types were placed in this encounter.         This note was electronically signed.    Dai Menjivar MD  July 26, 2019    Note: Speech recognition transcription software may have been used to create portions of this document.  An attempt at proofreading has been made but errors in transcription could still be present.

## 2019-08-17 DIAGNOSIS — K21.9 GASTROESOPHAGEAL REFLUX DISEASE, ESOPHAGITIS PRESENCE NOT SPECIFIED: ICD-10-CM

## 2019-08-21 RX ORDER — LANSOPRAZOLE 30 MG/1
CAPSULE, DELAYED RELEASE ORAL
Qty: 30 CAPSULE | Refills: 3 | Status: SHIPPED | OUTPATIENT
Start: 2019-08-21 | End: 2020-02-06 | Stop reason: SDUPTHER

## 2020-01-10 ENCOUNTER — TELEPHONE (OUTPATIENT)
Dept: OBSTETRICS AND GYNECOLOGY | Facility: CLINIC | Age: 40
End: 2020-01-10

## 2020-01-10 ENCOUNTER — OFFICE VISIT (OUTPATIENT)
Dept: OBSTETRICS AND GYNECOLOGY | Facility: CLINIC | Age: 40
End: 2020-01-10

## 2020-01-10 VITALS
WEIGHT: 193.6 LBS | HEIGHT: 64 IN | SYSTOLIC BLOOD PRESSURE: 122 MMHG | DIASTOLIC BLOOD PRESSURE: 80 MMHG | BODY MASS INDEX: 33.05 KG/M2

## 2020-01-10 DIAGNOSIS — N76.2 VULVAR CELLULITIS: Primary | ICD-10-CM

## 2020-01-10 PROCEDURE — 99213 OFFICE O/P EST LOW 20 MIN: CPT | Performed by: OBSTETRICS & GYNECOLOGY

## 2020-01-10 RX ORDER — HYDROCODONE BITARTRATE AND ACETAMINOPHEN 5; 325 MG/1; MG/1
1 TABLET ORAL EVERY 8 HOURS PRN
Qty: 5 TABLET | Refills: 0 | Status: SHIPPED | OUTPATIENT
Start: 2020-01-10 | End: 2020-03-25

## 2020-01-10 RX ORDER — SULFAMETHOXAZOLE AND TRIMETHOPRIM 800; 160 MG/1; MG/1
1 TABLET ORAL 2 TIMES DAILY
Qty: 28 TABLET | Refills: 0 | Status: SHIPPED | OUTPATIENT
Start: 2020-01-10 | End: 2020-01-24

## 2020-01-10 NOTE — TELEPHONE ENCOUNTER
She can be seen today or early next week. Otherwise, if no fever can continue to monitor versus seeing primary care physician or urgent care.     Thanks,   Dai Menjivar MD

## 2020-01-10 NOTE — PROGRESS NOTES
Subjective   Chief Complaint   Patient presents with   • Follow-up     pt states that she has a couple cysts in her left side groin area. pt states that this is something that hse has dealt with her whole life. stated that this is the worst that it has ever been. pt states that this particular episode has been going on for about 2.5 to 3 weeks. states that it is painful to the touch. also hurts to walk, sit, move.     Jesica Aguilera is a 39 y.o. year old .  No LMP recorded. Patient has had an implant.  She presents to be seen because of concern for cysts in her left groin area that have been going on for 3 weeks.  She reports she has been dealing with this for a long time.  She did see Lani Menjivar back in 2018 an attempt was made an incision and drainage and she did Bactrim for a few days and it did resolve.  She reports she is also had these on her buttocks.  She has seen other primary care physicians and dermatology in the past and they recommend use of black tar before she denies any fevers or chills at home.  She reports that it is so painful though that it is hard to even wear jeans and hard to walk.    OTHER THINGS SHE WANTS TO DISCUSS TODAY:  Nothing else    The following portions of the patient's history were reviewed and updated as appropriate:current medications and allergies    Social History    Tobacco Use      Smoking status: Current Every Day Smoker        Packs/day: 1.00        Years: 24.00        Pack years: 24        Types: Cigarettes        Start date:     Review of Systems  Constitutional POS: nothing reported    NEG: anorexia or night sweats   Genitourinary POS: nothing reported    NEG: dysuria or hematuria   Gastointestinal POS: nothing reported    NEG: bloating, change in bowel habits, melena or reflux symptoms   Integument POS: nothing reported    NEG: moles that are changing in size, shape, color or rashes   Breast POS: nothing reported    NEG: persistent breast lump,  "skin dimpling or nipple discharge         Objective   /80   Ht 161.3 cm (63.5\")   Wt 87.8 kg (193 lb 9.6 oz)   Breastfeeding No   BMI 33.76 kg/m²     General:  well developed; well nourished  no acute distress                           Pelvis: Clinical staff was present for exam  area around 6x4cm in size that is erythematous with some edema and slight induration. No fluctuation. Tender to touch. No active drainage.      Lab Review   Wound culture from 3/2018    Imaging   No data reviewed        Assessment   1. Left vulvar/mons cellulitis      Plan   1. Recommended patient start Bactrim DS twice daily for 14 days.  Reviewed trying warm and cold compresses to the area.  Reviewed that it is okay to continue using antibacterial soap but otherwise to keep the area clean and dry.  She will come back to the office within about a week's time for follow-up.  Reviewed precautions so for calling the office sooner.  Reviewed with her overall that I do not feel any fluctuant areas that need to be drained today.  2. Lortab 5 325 5 pills sent to her pharmacy to use as needed for breakthrough pain over the weekend.  However reviewed with her to use Tylenol and ibuprofen first for pain control.  3. The importance of keeping all planned follow-up and taking all medications as prescribed was emphasized.  4. Follow up for above in 1 week     New Medications Ordered This Visit   Medications   • sulfamethoxazole-trimethoprim (BACTRIM DS) 800-160 MG per tablet     Sig: Take 1 tablet by mouth 2 (Two) Times a Day for 14 days.     Dispense:  28 tablet     Refill:  0   • HYDROcodone-acetaminophen (LORTAB) 5-325 MG per tablet     Sig: Take 1 tablet by mouth Every 8 (Eight) Hours As Needed for Severe Pain .     Dispense:  5 tablet     Refill:  0        Total time spent today with Jesica  was 20 minutes (level 3).  Of this time, > 50% was spent face-to-face time coordinating care, answering her questions and counseling regarding " pathophysiology of her presenting problem along with plans for any diagnositc work-up and treatment.    This note was electronically signed.    Dai Menjivar MD  January 10, 2020    Note: Speech recognition transcription software may have been used to create portions of this document.  An attempt at proofreading has been made but errors in transcription could still be present.

## 2020-01-10 NOTE — TELEPHONE ENCOUNTER
Provider Name  Dr Menjivar  Reason for Call  Patient has cyst on left side groin area, very painful and draining, please call her  Pharmacy Name  [facility]  Call Back Number  [phone]

## 2020-02-06 ENCOUNTER — TELEPHONE (OUTPATIENT)
Dept: FAMILY MEDICINE CLINIC | Facility: CLINIC | Age: 40
End: 2020-02-06

## 2020-02-06 DIAGNOSIS — K21.9 GASTROESOPHAGEAL REFLUX DISEASE, ESOPHAGITIS PRESENCE NOT SPECIFIED: ICD-10-CM

## 2020-02-06 RX ORDER — LANSOPRAZOLE 30 MG/1
30 CAPSULE, DELAYED RELEASE ORAL DAILY
Qty: 30 CAPSULE | Refills: 3 | Status: CANCELLED | OUTPATIENT
Start: 2020-02-06

## 2020-02-06 RX ORDER — LANSOPRAZOLE 30 MG/1
30 CAPSULE, DELAYED RELEASE ORAL DAILY
Qty: 30 CAPSULE | Refills: 1 | Status: SHIPPED | OUTPATIENT
Start: 2020-02-06 | End: 2020-03-25 | Stop reason: SDUPTHER

## 2020-02-06 NOTE — TELEPHONE ENCOUNTER
Patient has not been seen in over a year needs an appointment to come in for evaluation before medications can be prescribed.

## 2020-02-06 NOTE — TELEPHONE ENCOUNTER
Joe dhillon pt was last seen in 05/2019, 30 day supply sent with one refill- she says she is due to come back in march for her other refills

## 2020-02-06 NOTE — TELEPHONE ENCOUNTER
Pt called to request a refill of the listed medication     lansoprazole (PREVACID) 30 MG capsule    [facility]        Ph: [phone]  Fax; [phone]

## 2020-03-12 ENCOUNTER — TELEPHONE (OUTPATIENT)
Dept: FAMILY MEDICINE CLINIC | Facility: CLINIC | Age: 40
End: 2020-03-12

## 2020-03-12 NOTE — TELEPHONE ENCOUNTER
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

## 2020-03-23 ENCOUNTER — TELEPHONE (OUTPATIENT)
Dept: PULMONOLOGY | Facility: CLINIC | Age: 40
End: 2020-03-23

## 2020-03-23 DIAGNOSIS — J44.9 CHRONIC OBSTRUCTIVE PULMONARY DISEASE, UNSPECIFIED COPD TYPE (HCC): Primary | ICD-10-CM

## 2020-03-25 ENCOUNTER — OFFICE VISIT (OUTPATIENT)
Dept: FAMILY MEDICINE CLINIC | Facility: CLINIC | Age: 40
End: 2020-03-25

## 2020-03-25 ENCOUNTER — LAB (OUTPATIENT)
Dept: LAB | Facility: HOSPITAL | Age: 40
End: 2020-03-25

## 2020-03-25 VITALS
HEIGHT: 64 IN | OXYGEN SATURATION: 99 % | WEIGHT: 185 LBS | TEMPERATURE: 98.8 F | BODY MASS INDEX: 31.58 KG/M2 | DIASTOLIC BLOOD PRESSURE: 68 MMHG | SYSTOLIC BLOOD PRESSURE: 110 MMHG | HEART RATE: 113 BPM

## 2020-03-25 DIAGNOSIS — Z00.00 GENERAL MEDICAL EXAM: Primary | ICD-10-CM

## 2020-03-25 DIAGNOSIS — L70.0 CYSTIC ACNE: ICD-10-CM

## 2020-03-25 DIAGNOSIS — Z00.00 GENERAL MEDICAL EXAM: ICD-10-CM

## 2020-03-25 DIAGNOSIS — K21.9 GASTROESOPHAGEAL REFLUX DISEASE, ESOPHAGITIS PRESENCE NOT SPECIFIED: ICD-10-CM

## 2020-03-25 DIAGNOSIS — E66.9 CLASS 1 OBESITY WITH SERIOUS COMORBIDITY AND BODY MASS INDEX (BMI) OF 32.0 TO 32.9 IN ADULT, UNSPECIFIED OBESITY TYPE: ICD-10-CM

## 2020-03-25 DIAGNOSIS — F41.9 ANXIETY: ICD-10-CM

## 2020-03-25 PROBLEM — J41.8 MIXED SIMPLE AND MUCOPURULENT CHRONIC BRONCHITIS (HCC): Status: ACTIVE | Noted: 2018-05-14

## 2020-03-25 LAB
ALBUMIN SERPL-MCNC: 4.7 G/DL (ref 3.5–5.2)
ALBUMIN/GLOB SERPL: 1.9 G/DL
ALP SERPL-CCNC: 66 U/L (ref 39–117)
ALT SERPL W P-5'-P-CCNC: 25 U/L (ref 1–33)
ANION GAP SERPL CALCULATED.3IONS-SCNC: 15.5 MMOL/L (ref 5–15)
AST SERPL-CCNC: 25 U/L (ref 1–32)
BASOPHILS # BLD AUTO: 0.05 10*3/MM3 (ref 0–0.2)
BASOPHILS NFR BLD AUTO: 0.6 % (ref 0–1.5)
BILIRUB SERPL-MCNC: 0.2 MG/DL (ref 0.2–1.2)
BUN BLD-MCNC: 9 MG/DL (ref 6–20)
BUN/CREAT SERPL: 9.2 (ref 7–25)
CALCIUM SPEC-SCNC: 9.3 MG/DL (ref 8.6–10.5)
CHLORIDE SERPL-SCNC: 100 MMOL/L (ref 98–107)
CHOLEST SERPL-MCNC: 167 MG/DL (ref 0–200)
CO2 SERPL-SCNC: 21.5 MMOL/L (ref 22–29)
CREAT BLD-MCNC: 0.98 MG/DL (ref 0.57–1)
DEPRECATED RDW RBC AUTO: 43.6 FL (ref 37–54)
EOSINOPHIL # BLD AUTO: 0.16 10*3/MM3 (ref 0–0.4)
EOSINOPHIL NFR BLD AUTO: 1.8 % (ref 0.3–6.2)
ERYTHROCYTE [DISTWIDTH] IN BLOOD BY AUTOMATED COUNT: 15.1 % (ref 12.3–15.4)
GFR SERPL CREATININE-BSD FRML MDRD: 64 ML/MIN/1.73
GLOBULIN UR ELPH-MCNC: 2.5 GM/DL
GLUCOSE BLD-MCNC: 91 MG/DL (ref 65–99)
HCT VFR BLD AUTO: 36.1 % (ref 34–46.6)
HDLC SERPL-MCNC: 39 MG/DL (ref 40–60)
HGB BLD-MCNC: 11.3 G/DL (ref 12–15.9)
IMM GRANULOCYTES # BLD AUTO: 0.04 10*3/MM3 (ref 0–0.05)
IMM GRANULOCYTES NFR BLD AUTO: 0.5 % (ref 0–0.5)
LDLC SERPL CALC-MCNC: 107 MG/DL (ref 0–100)
LDLC/HDLC SERPL: 2.74 {RATIO}
LYMPHOCYTES # BLD AUTO: 2.47 10*3/MM3 (ref 0.7–3.1)
LYMPHOCYTES NFR BLD AUTO: 27.9 % (ref 19.6–45.3)
MCH RBC QN AUTO: 25.2 PG (ref 26.6–33)
MCHC RBC AUTO-ENTMCNC: 31.3 G/DL (ref 31.5–35.7)
MCV RBC AUTO: 80.4 FL (ref 79–97)
MONOCYTES # BLD AUTO: 0.47 10*3/MM3 (ref 0.1–0.9)
MONOCYTES NFR BLD AUTO: 5.3 % (ref 5–12)
NEUTROPHILS # BLD AUTO: 5.66 10*3/MM3 (ref 1.7–7)
NEUTROPHILS NFR BLD AUTO: 63.9 % (ref 42.7–76)
NRBC BLD AUTO-RTO: 0 /100 WBC (ref 0–0.2)
PLATELET # BLD AUTO: 291 10*3/MM3 (ref 140–450)
PMV BLD AUTO: 11.2 FL (ref 6–12)
POTASSIUM BLD-SCNC: 4.7 MMOL/L (ref 3.5–5.2)
PROT SERPL-MCNC: 7.2 G/DL (ref 6–8.5)
RBC # BLD AUTO: 4.49 10*6/MM3 (ref 3.77–5.28)
SODIUM BLD-SCNC: 137 MMOL/L (ref 136–145)
TRIGL SERPL-MCNC: 106 MG/DL (ref 0–150)
TSH SERPL DL<=0.05 MIU/L-ACNC: 2.1 UIU/ML (ref 0.27–4.2)
VLDLC SERPL-MCNC: 21.2 MG/DL (ref 5–40)
WBC NRBC COR # BLD: 8.85 10*3/MM3 (ref 3.4–10.8)

## 2020-03-25 PROCEDURE — 80061 LIPID PANEL: CPT

## 2020-03-25 PROCEDURE — 99395 PREV VISIT EST AGE 18-39: CPT | Performed by: PHYSICIAN ASSISTANT

## 2020-03-25 PROCEDURE — 85025 COMPLETE CBC W/AUTO DIFF WBC: CPT

## 2020-03-25 PROCEDURE — 80053 COMPREHEN METABOLIC PANEL: CPT

## 2020-03-25 PROCEDURE — 36415 COLL VENOUS BLD VENIPUNCTURE: CPT

## 2020-03-25 PROCEDURE — 84443 ASSAY THYROID STIM HORMONE: CPT

## 2020-03-25 RX ORDER — SULFAMETHOXAZOLE AND TRIMETHOPRIM 400; 80 MG/1; MG/1
TABLET ORAL
Qty: 60 TABLET | Refills: 3 | Status: SHIPPED | OUTPATIENT
Start: 2020-03-25 | End: 2020-10-07

## 2020-03-25 RX ORDER — SPIRONOLACTONE 50 MG/1
50 TABLET, FILM COATED ORAL DAILY
Qty: 30 TABLET | Refills: 5 | Status: SHIPPED | OUTPATIENT
Start: 2020-03-25 | End: 2020-12-18

## 2020-03-25 RX ORDER — LANSOPRAZOLE 30 MG/1
30 CAPSULE, DELAYED RELEASE ORAL DAILY
Qty: 30 CAPSULE | Refills: 11 | Status: SHIPPED | OUTPATIENT
Start: 2020-03-25 | End: 2021-03-26 | Stop reason: SDUPTHER

## 2020-03-25 NOTE — PROGRESS NOTES
Subjective   Jesica Aguilera is a 39 y.o. female  Annual Exam (Annual physical Exam ); Anxiety (follow up on anxiety, req refills on Diazepam ); and Heartburn (follow up GERD, req refill on Prevacid )      History of Present Illness  Patient presents today for a preventive medical visit.  Patient is here to determine screening labs and tests that are due and to determine immunization status as well.  Patient will be counseled regarding preventative medicine issues such as regular exercise and  healthy diet as well.  The following portions of the patient's history were reviewed and updated as appropriate: allergies, current medications, past social history and problem list    Review of Systems   Constitutional: Negative.  Negative for appetite change and fatigue.   HENT: Negative.    Eyes: Negative.    Respiratory: Negative.  Negative for chest tightness and shortness of breath.    Cardiovascular: Negative.    Gastrointestinal: Negative.  Negative for abdominal pain, diarrhea and nausea.        GERD stable on medication, symptomatic off of medication   Endocrine: Negative.    Genitourinary: Negative.    Musculoskeletal: Negative.    Skin: Negative.         Recurrent cystic acne has done well in the past on prophylactic doses of Bactrim and spironolactone requests refill   Allergic/Immunologic: Negative.    Neurological: Negative.  Negative for dizziness, tremors, weakness, light-headedness and headaches.   Hematological: Negative.    Psychiatric/Behavioral: Positive for sleep disturbance. Negative for agitation, behavioral problems, confusion, decreased concentration, dysphoric mood, self-injury and suicidal ideas. The patient is nervous/anxious.    All other systems reviewed and are negative.      Objective     Vitals:    03/25/20 1400   BP: 110/68   Pulse: 113   Temp: 98.8 °F (37.1 °C)   SpO2: 99%       Physical Exam   Constitutional: She is oriented to person, place, and time. She appears well-developed and  well-nourished. No distress.   Obesity noted     HENT:   Head: Normocephalic and atraumatic.   Right Ear: External ear normal.   Left Ear: External ear normal.   Nose: Nose normal.   Mouth/Throat: Oropharynx is clear and moist.   Eyes: Pupils are equal, round, and reactive to light. Conjunctivae and EOM are normal.   Neck: Normal range of motion. Neck supple. No JVD present. Carotid bruit is not present. No thyroid mass and no thyromegaly present.   Cardiovascular: Normal rate, regular rhythm, normal heart sounds and intact distal pulses.   No murmur heard.  Pulmonary/Chest: Effort normal and breath sounds normal. No respiratory distress.   Abdominal: Soft. Bowel sounds are normal. She exhibits no mass. There is no tenderness.   Musculoskeletal: Normal range of motion. She exhibits no edema.   Lymphadenopathy:     She has no cervical adenopathy.   Neurological: She is alert and oriented to person, place, and time. She has normal reflexes. No cranial nerve deficit. Coordination normal.   Skin: Skin is warm and dry. No rash noted. She is not diaphoretic. No erythema.   Psychiatric: Her speech is normal and behavior is normal. Judgment and thought content normal. Her mood appears anxious. Her affect is not angry and not inappropriate. Cognition and memory are normal. She does not exhibit a depressed mood. She is attentive.   Nursing note and vitals reviewed.    Discussed preventative medicine issues with patient including regular exercise, healthy diet, stress reduction, adequate sleep and recommended age-appropriate screening studies.  Assessment/Plan     Jesica was seen today for annual exam, anxiety and heartburn.    Diagnoses and all orders for this visit:    General medical exam  -     Lipid Panel; Future  -     Cancel: Basic metabolic panel; Future  -     TSH; Future  -     CBC & Differential; Future    Gastroesophageal reflux disease, esophagitis presence not specified  -     lansoprazole (PREVACID) 30 MG  capsule; Take 1 capsule by mouth Daily.  -     Comprehensive metabolic panel; Future    Anxiety    Class 1 obesity with serious comorbidity and body mass index (BMI) of 32.0 to 32.9 in adult, unspecified obesity type    Cystic acne    Other orders  -     sulfamethoxazole-trimethoprim (Bactrim) 400-80 MG tablet; Take one daily for prevention of cystic acne and increase to twice daily for flares  -     spironolactone (Aldactone) 50 MG tablet; Take 1 tablet by mouth Daily. For cystic acne    Advise low calorie low-carb diet regular exercise follow-up in 3 months for recheck of weight and anxiety.  New prescription given for diazepam 5 mg 1 daily as needed for anxiety #30 with 2 refills follow-up in 3 months discussed abuse potential controlled substance status of this medication.

## 2020-03-26 ENCOUNTER — TELEPHONE (OUTPATIENT)
Dept: FAMILY MEDICINE CLINIC | Facility: CLINIC | Age: 40
End: 2020-03-26

## 2020-03-26 ENCOUNTER — TELEPHONE (OUTPATIENT)
Dept: PULMONOLOGY | Facility: CLINIC | Age: 40
End: 2020-03-26

## 2020-03-26 RX ORDER — TIZANIDINE HYDROCHLORIDE 2 MG/1
2 CAPSULE, GELATIN COATED ORAL 3 TIMES DAILY
Qty: 30 CAPSULE | Refills: 5 | Status: SHIPPED | OUTPATIENT
Start: 2020-03-26 | End: 2020-03-27 | Stop reason: SDUPTHER

## 2020-03-26 NOTE — TELEPHONE ENCOUNTER
PT CALLED AND SAID SHE WENT TO  MEDS AT THE PHARMACY AND SHE DIDN'T HAVE A MUSCLE RELAXER SHE REQUESTED FOR HER JAW; ALSO SHE WOULD LIKE A CALL BACK REGARDING HER LABWORK    ALLY: [phone]

## 2020-03-26 NOTE — TELEPHONE ENCOUNTER
Please call in Zanaflex 2 mg tablets half to 1 at nighttime for TMJ/jaw pain spasm #30 with 5 refills

## 2020-03-26 NOTE — TELEPHONE ENCOUNTER
Notified patient on lab letter, she doesn't have menstrual cycles, but will start the iron supplement OTC and follow up with you, she also said she spoke to you about receiving a muscle relaxer

## 2020-03-27 ENCOUNTER — TELEPHONE (OUTPATIENT)
Dept: FAMILY MEDICINE CLINIC | Facility: CLINIC | Age: 40
End: 2020-03-27

## 2020-03-27 RX ORDER — TIZANIDINE HYDROCHLORIDE 2 MG/1
CAPSULE, GELATIN COATED ORAL
Qty: 30 CAPSULE | Refills: 5 | Status: SHIPPED | OUTPATIENT
Start: 2020-03-27 | End: 2020-06-26

## 2020-03-27 NOTE — TELEPHONE ENCOUNTER
PHARMACY CALLED IN AND STATED SHE NEEDS MORE DIRECTIONS FOR THE PATIENTS TiZANidine (Zanaflex) 2 MG capsule THAT SHE IS ON.    PHARMACY CALL BACK [phone]    PHARMACY REQUESTING A CALL BACK    PLEASE ADVISE

## 2020-06-26 ENCOUNTER — OFFICE VISIT (OUTPATIENT)
Dept: FAMILY MEDICINE CLINIC | Facility: CLINIC | Age: 40
End: 2020-06-26

## 2020-06-26 VITALS
BODY MASS INDEX: 31.24 KG/M2 | TEMPERATURE: 98.4 F | OXYGEN SATURATION: 99 % | DIASTOLIC BLOOD PRESSURE: 82 MMHG | SYSTOLIC BLOOD PRESSURE: 134 MMHG | HEIGHT: 64 IN | WEIGHT: 183 LBS | HEART RATE: 92 BPM

## 2020-06-26 DIAGNOSIS — F41.9 ANXIETY: Primary | ICD-10-CM

## 2020-06-26 DIAGNOSIS — L70.0 CYSTIC ACNE: ICD-10-CM

## 2020-06-26 DIAGNOSIS — Z87.898 HISTORY OF PREDIABETES: ICD-10-CM

## 2020-06-26 DIAGNOSIS — E66.9 CLASS 1 OBESITY WITH SERIOUS COMORBIDITY AND BODY MASS INDEX (BMI) OF 32.0 TO 32.9 IN ADULT, UNSPECIFIED OBESITY TYPE: ICD-10-CM

## 2020-06-26 PROCEDURE — 99214 OFFICE O/P EST MOD 30 MIN: CPT | Performed by: PHYSICIAN ASSISTANT

## 2020-06-26 RX ORDER — TIZANIDINE 2 MG/1
TABLET ORAL
COMMUNITY
Start: 2020-05-05 | End: 2021-04-05

## 2020-06-26 NOTE — PROGRESS NOTES
Subjective   Jesica Aguilera is a 39 y.o. female  Anxiety (Follow up on anxiety, req refills on Diazepam )      History of Present Illness  Patient is a pleasant 39-year-old female who presents today for follow-up on anxiety.  She states that her anxiety is currently well controlled with a combination of decreasing stress in her life, coping strategies, meditation and Valium as needed.  She is taking it less than daily.  She states that she feels extremely anxious and is unable to control her anxiety with relaxation techniques the diazepam is effective.  She denies any adverse effects of this medication.  She is also following up on obesity.  She is trying to follow a low calorie diet and trying to increase her exercise.  Her BMI is still in the range of obesity at 31.9.  Patient is a history of prediabetes and history of recurrent cystic acne which are both comorbidities associated with her obesity.  She is interested in trying Saxenda for weight loss.  The following portions of the patient's history were reviewed and updated as appropriate: allergies, current medications, past social history and problem list    Review of Systems   Constitutional: Positive for activity change. Negative for appetite change, fatigue and unexpected weight change.   Respiratory: Negative for chest tightness and shortness of breath.    Cardiovascular: Negative for chest pain.   Gastrointestinal: Negative for abdominal distention, abdominal pain, diarrhea and nausea.   Skin:        Recurrent cystic acne, asymptomatic at this time   Allergic/Immunologic: Negative.    Neurological: Negative for dizziness, tremors, weakness, light-headedness and headaches.   Psychiatric/Behavioral: Negative for agitation, behavioral problems, confusion, decreased concentration, dysphoric mood, sleep disturbance and suicidal ideas. The patient is nervous/anxious.        Objective     Vitals:    06/26/20 1455   BP: 134/82   Pulse: 92   Temp: 98.4 °F (36.9  °C)   SpO2: 99%   BMI 31.9    Physical Exam   Constitutional: She is oriented to person, place, and time. She appears well-developed and well-nourished. No distress.   Obesity noted     Neck: No thyroid mass and no thyromegaly present.   Cardiovascular: Normal rate, regular rhythm and normal heart sounds.   Pulmonary/Chest: Effort normal and breath sounds normal.   Abdominal: Soft. There is no tenderness.   Neurological: She is alert and oriented to person, place, and time.   Skin: She is not diaphoretic.   Acne scars noted, acne with evidence of previous scars around axilla   Psychiatric: Her speech is normal and behavior is normal. Judgment and thought content normal. Her mood appears anxious. Her affect is not angry and not inappropriate. Cognition and memory are normal. She does not exhibit a depressed mood. She is attentive.   Nursing note and vitals reviewed.      Assessment/Plan     Jesica was seen today for anxiety.    Diagnoses and all orders for this visit:    Anxiety    Class 1 obesity with serious comorbidity and body mass index (BMI) of 32.0 to 32.9 in adult, unspecified obesity type    History of prediabetes    Cystic acne    Other orders  -     Liraglutide -Weight Management 18 MG/3ML solution pen-injector; Inject 0.6 mg under the skin into the appropriate area as directed Daily.    Prescription written for Valium 5 mg 1 daily as needed #30 with 2 refills.  Follow-up in 3 -6 months as needed.  Discussed abuse potential controlled substance as of this medication.  Discussed importance of low calorie, low-carb diet, adequate water intake and regular daily exercise

## 2020-07-09 ENCOUNTER — TELEPHONE (OUTPATIENT)
Dept: FAMILY MEDICINE CLINIC | Facility: CLINIC | Age: 40
End: 2020-07-09

## 2020-07-09 NOTE — TELEPHONE ENCOUNTER
As long as you remain covered by your prescription drug plan and there are no  changes to your plan benefits, this request is approved for the following time period:  07/09/2020 - 11/06/2020    LVm for patient to contact patient

## 2020-07-31 ENCOUNTER — OFFICE VISIT (OUTPATIENT)
Dept: OBSTETRICS AND GYNECOLOGY | Facility: CLINIC | Age: 40
End: 2020-07-31

## 2020-07-31 VITALS
SYSTOLIC BLOOD PRESSURE: 118 MMHG | WEIGHT: 176.2 LBS | BODY MASS INDEX: 30.08 KG/M2 | DIASTOLIC BLOOD PRESSURE: 76 MMHG | HEIGHT: 64 IN

## 2020-07-31 DIAGNOSIS — Z01.419 WELL WOMAN EXAM: Primary | ICD-10-CM

## 2020-07-31 DIAGNOSIS — L02.92 CARBUNCLE AND FURUNCLE: ICD-10-CM

## 2020-07-31 DIAGNOSIS — L02.93 CARBUNCLE AND FURUNCLE: ICD-10-CM

## 2020-07-31 PROBLEM — Z30.430 ENCOUNTER FOR INSERTION OF MIRENA IUD: Status: ACTIVE | Noted: 2020-07-31

## 2020-07-31 PROCEDURE — 99395 PREV VISIT EST AGE 18-39: CPT | Performed by: OBSTETRICS & GYNECOLOGY

## 2020-07-31 RX ORDER — SULFAMETHOXAZOLE AND TRIMETHOPRIM 800; 160 MG/1; MG/1
1 TABLET ORAL 2 TIMES DAILY
Qty: 28 TABLET | Refills: 0 | Status: SHIPPED | OUTPATIENT
Start: 2020-07-31 | End: 2020-08-14

## 2020-07-31 NOTE — PROGRESS NOTES
Subjective   Chief Complaint   Patient presents with   • Gynecologic Exam     annual; pt states she has some active cysts     Jesica Aguilrea is a 39 y.o. year old  presenting to be seen for her annual exam.     SEXUAL Hx:  She is not currently sexually active.  In the past year there there has been NO new sexual partners.    Condoms are not needed because she is not sexually active.  She would not like to be screened for STD's at today's exam.  Current birth control method: IUD - Mirena.  She is happy with her current method of contraception and does not want to discuss alternative methods of contraception.  MENSTRUAL Hx:  No LMP recorded (lmp unknown). Patient has had an implant.  Intermenstrual bleeding is n/a.    Post-coital bleeding is n/a.  Dysmenorrhea: none  PMS: none  Her cycles are not a source of concern for her that she wishes to discuss today.  HEALTH Hx:  She exercises regularly: yes.  She wears her seat belt: yes.  She has concerns about domestic violence: no.  OTHER THINGS SHE WANTS TO DISCUSS TODAY:  Patient reports she still having issues with the lesions on her vulva resolving folliculitis etc. from history of staph.  Her PCP put her on Bactrim daily and Aldactone.  She reports however now she is having some increased pain.    The following portions of the patient's history were reviewed and updated as appropriate:problem list, current medications, allergies, past family history, past medical history, past social history and past surgical history.    Social History    Tobacco Use      Smoking status: Current Every Day Smoker        Packs/day: 1.00        Years: 24.00        Pack years: 24        Types: Cigarettes        Start date:       Smokeless tobacco: Never Used    Review of Systems  Constitutional POS: nothing reported    NEG: anorexia or night sweats   Genitourinary POS: MICHELLE is present but it IS NOT effecting her ADL's    NEG: dysuria or hematuria      Gastointestinal POS:  "nothing reported    NEG: bloating, change in bowel habits, melena or reflux symptoms   Integument POS: nothing reported    NEG: moles that are changing in size, shape, color or rashes   Breast POS: nothing reported    NEG: persistent breast lump, skin dimpling or nipple discharge        Objective   /76   Ht 161.3 cm (63.5\")   Wt 79.9 kg (176 lb 3.2 oz)   LMP  (LMP Unknown)   Breastfeeding No   BMI 30.72 kg/m²     General:  well developed; well nourished  no acute distress   Skin:  No suspicious lesions seen   Thyroid: normal to inspection and palpation   Breasts:  Examined in supine position  Symmetric without masses or skin dimpling  Nipples normal without inversion, lesions or discharge  There are no palpable axillary nodes   Abdomen: soft, non-tender; no masses  no umbilical or inguinal hernias are present  no hepato-splenomegaly   Pelvis: Clinical staff was present for exam  External genitalia:  furuncle areas on mons and bilateral vulva painful to touch  :  urethral meatus normal;  Vaginal:  normal pink mucosa without prolapse or lesions.  Cervix:  normal appearance.  Uterus:  normal size, shape and consistency.  Adnexa:  normal bimanual exam of the adnexa.  Rectal:  digital rectal exam not performed; anus visually normal appearing.        Assessment   1. Normal GYN exam  2. Furuncle of mons and bilateral vulva      Plan   Pap was not done today.  I explained to Jesica that the recommendations for Pap smear interval in a low risk patient has lengthened to 3 years time.  I told Jesica she still needs to be seen in our office yearly for a full physical including breast and pelvic exam  Prescription(s) for Bactrim DS were rx today. I did swab areas for HSV, staph, MRSA today   The importance of keeping all planned follow-up and taking all medications as prescribed was emphasized.  Follow up for annual exam in one year    New Medications Ordered This Visit   Medications   • " sulfamethoxazole-trimethoprim (Bactrim DS) 800-160 MG per tablet     Sig: Take 1 tablet by mouth 2 (Two) Times a Day for 14 days.     Dispense:  28 tablet     Refill:  0          This note was electronically signed.    Dai Menjivar MD  July 31, 2020    Note: Speech recognition transcription software may have been used to create portions of this document.  An attempt at proofreading has been made but errors in transcription could still be present.

## 2020-09-15 ENCOUNTER — TELEPHONE (OUTPATIENT)
Dept: OBSTETRICS AND GYNECOLOGY | Facility: CLINIC | Age: 40
End: 2020-09-15

## 2020-09-15 ENCOUNTER — OFFICE VISIT (OUTPATIENT)
Dept: PULMONOLOGY | Facility: CLINIC | Age: 40
End: 2020-09-15

## 2020-09-15 VITALS
WEIGHT: 169 LBS | HEART RATE: 116 BPM | TEMPERATURE: 97.7 F | OXYGEN SATURATION: 94 % | HEIGHT: 63 IN | BODY MASS INDEX: 29.95 KG/M2 | DIASTOLIC BLOOD PRESSURE: 76 MMHG | SYSTOLIC BLOOD PRESSURE: 110 MMHG

## 2020-09-15 DIAGNOSIS — J44.9 CHRONIC OBSTRUCTIVE PULMONARY DISEASE, UNSPECIFIED COPD TYPE (HCC): Primary | ICD-10-CM

## 2020-09-15 DIAGNOSIS — Z72.0 TOBACCO ABUSE: ICD-10-CM

## 2020-09-15 PROCEDURE — 99214 OFFICE O/P EST MOD 30 MIN: CPT | Performed by: INTERNAL MEDICINE

## 2020-09-15 RX ORDER — ALBUTEROL SULFATE 90 UG/1
2 AEROSOL, METERED RESPIRATORY (INHALATION) EVERY 4 HOURS PRN
Qty: 18 G | Refills: 6 | Status: SHIPPED | OUTPATIENT
Start: 2020-09-15

## 2020-09-15 RX ORDER — VARENICLINE TARTRATE 1 MG/1
1 TABLET, FILM COATED ORAL 2 TIMES DAILY
Qty: 60 TABLET | Refills: 4 | Status: SHIPPED | OUTPATIENT
Start: 2020-09-15 | End: 2021-04-05 | Stop reason: SDUPTHER

## 2020-09-15 NOTE — PROGRESS NOTES
"Pulmonary Office Follow Up      Subjective   Chief Complaint: Shortness of Breath    Jesica Aguilera is a 39 y.o. female is being seen in follow up for COPD    History of Present Illness    Ms. Aguilera is a 38yo F with a history of tobacco abuse who was initially seen on 1/10/18. At that time, she had PFTs performed which showed moderate airway obstruction. She was started on Stiolto. Her insurance required her to try Anoro which did not work for her and she then resumed the Stiolto.     She was last seen in clinic on 3/21/19. At that time, she was continued on Stiolto and encouraged to quit smoking.     She returns to clinic today for follow up. Since she was last seen, she reports that she has continued to smoke. She tried the Chantix but was unable to sustain abstinence from smoking. She is still smoking heavily. She is very scared that she will die early related to smoking complications. She has been using Stiolto daily since the PA was approved. She was due for PFTs but refused COVID testing.       The following portions of the patient's history were reviewed and updated as appropriate: allergies, current medications, past family history, past medical history, past social history, past surgical history and problem list.    Review of Systems   Constitutional: Positive for appetite change.   Eyes: Positive for photophobia, redness and itching.   Respiratory: Positive for chest tightness and shortness of breath.    Cardiovascular: Negative.    Gastrointestinal: Negative.    Endocrine: Negative.    Genitourinary: Negative.    Musculoskeletal: Positive for back pain, neck pain and neck stiffness.   Skin: Negative.    Allergic/Immunologic: Negative.    Neurological: Positive for dizziness, light-headedness and headaches.   Hematological: Negative.    Psychiatric/Behavioral: Positive for dysphoric mood.         Objective   Blood pressure 110/76, pulse 116, temperature 97.7 °F (36.5 °C), height 160 cm (63\"), weight " 76.7 kg (169 lb), SpO2 94 %, not currently breastfeeding.  Physical Exam   Constitutional: She is oriented to person, place, and time. She appears well-developed. No distress.   HENT:   Head: Normocephalic and atraumatic.   Eyes: Pupils are equal, round, and reactive to light. Conjunctivae are normal. No scleral icterus.   Neck: Normal range of motion. Neck supple. No tracheal deviation present. No thyromegaly present.   Cardiovascular: Normal rate, regular rhythm and normal heart sounds.   Pulmonary/Chest: Effort normal and breath sounds normal. No respiratory distress.   Abdominal: Soft. Bowel sounds are normal. There is no abdominal tenderness.   Musculoskeletal: Normal range of motion.   Lymphadenopathy:     She has no cervical adenopathy.   Neurological: She is alert and oriented to person, place, and time. She exhibits normal muscle tone. Coordination normal.   Skin: Skin is warm and dry. No rash noted. No erythema.   Psychiatric: Her speech is normal and behavior is normal. Judgment normal.   Nursing note and vitals reviewed.      PFTs:  No new PFTs.     Imaging:  Chest xray performed in clinic today and personally reviewed. This is a PA/Lateral film. The cardiac and mediastinal contours are within normal limits. The lungs are hyperinflated but clear bilaterally. There are nipple piercings bilaterally. There is no pneumothorax or pleural effusion.     Impression: No acute cardiopulmonary process.     Assessment/Plan   Jesica was seen today for copd and shortness of breath.    Diagnoses and all orders for this visit:    Chronic obstructive pulmonary disease, unspecified COPD type (CMS/[facility])  -     XR Chest PA & Lateral    Tobacco abuse    Other orders  -     varenicline (CHANTIX MARCELINO) 0.5 MG X 11 & 1 MG X 42 tablet; Use as directed on package instructions, try to quit smoking after 1 week  -     varenicline (CHANTIX) 1 MG tablet; Take 1 tablet by mouth 2 (Two) Times a Day.  -     albuterol sulfate  (90  Base) MCG/ACT inhaler; Inhale 2 puffs Every 4 (Four) Hours As Needed for Wheezing.        Discussion:  Ms. Aguilera is a 40yo F who returns to clinic for follow up.     1. Moderate COPD  - No exacerbations since last visit.   - Continue Stiolto and PRN Albuterol   - Alpha-1-antitrypsin phenotype is normal.     2. Tobacco Abuse  - 20 minutes devoted to smoking cessation counseling.   - She wants to try Chantix again.Rx sent for starter pack and continuation pack.   - I have suggested alternatives such as using a plastic straw to simulate smoking and joining an online support group.     Follow up in 6 months.     I have spent 30 minutes face to face with the patient with greater than 50% of the time spent counseling on smoking cessation.      Monica V Case, DO  Pulmonary and Critical Care Medicine  Note electronically signed

## 2020-09-15 NOTE — TELEPHONE ENCOUNTER
Can we get the records from her dermatology visit so I can know more regarding the need for this referral? Also, has she tried to contact the dermatologist office to see if they can switch the referral to [facility]?

## 2020-09-15 NOTE — TELEPHONE ENCOUNTER
Dr Menjivar pt    She is calling to say she she seen the dermatologist (Nia SCHAFFER with [facility]) today and is being referred to a surgery ( Dr Pranay Nina @ [facility]) but he is part of [facility] and she wants to stay with [facility].   She was told to get seen ASAP by the surgeon and she is in discomfort.   This if for her boils/cyst (Hydradenitis suppurativa).      Pt is hoping someone can direct her rather than wait for Dr. Menjivar to return on Monday

## 2020-09-16 ENCOUNTER — TELEPHONE (OUTPATIENT)
Dept: OBSTETRICS AND GYNECOLOGY | Facility: CLINIC | Age: 40
End: 2020-09-16

## 2020-09-18 ENCOUNTER — TELEPHONE (OUTPATIENT)
Dept: OBSTETRICS AND GYNECOLOGY | Facility: CLINIC | Age: 40
End: 2020-09-18

## 2020-09-18 DIAGNOSIS — L73.2 HIDRADENITIS SUPPURATIVA: Primary | ICD-10-CM

## 2020-09-18 NOTE — TELEPHONE ENCOUNTER
I guess I don't understand why the dermatologist can't refer her but I am happy to put the referral in.     Orders Placed This Encounter   Procedures   • Ambulatory Referral to Gynecologic Oncology     Referral Priority:   Routine     Referral Type:   Consultation     Referral Reason:   Specialty Services Required     Referred to Provider:   Annamarie Russell MD     Requested Specialty:   Gynecologic Oncology     Dai Menjivar MD

## 2020-09-18 NOTE — TELEPHONE ENCOUNTER
----- Message from Justina Krishna sent at 9/18/2020  9:39 AM EDT -----  Regarding: referral to Dr Russell  Patient can be referred to Dr Russell - she does surgery for this condition.    She was seen by Lani Guerrero prior to Dr Menjivar.

## 2020-09-24 NOTE — PROGRESS NOTES
Jesica Aguilera  [id number]  1980      Reason for visit: Chronic folliculitis, possible hidradenitis suppurativa    History of present illness:  The patient is a 39 y.o. female who presents today for treatment and evaluation of the above issues.    Ms. Aguilera reports that she initially started having issues with hidradenitis in the groin around the time of puberty.  Initially lesions are mostly on her buttocks and did not cause significant pain.  Throughout her teens and 20s she would experience a few flareups per year and she managed these well.  However within the last 6 to 12 months she has had increasing frequency and now feels like she constantly has areas that are draining are extremely tender.  She reports that most recently she is also had foul-smelling drainage from multiple areas in the groin.  She was recently seen by Dr. Menjivar and was referred to dermatology.  Dr. Menjivar also obtained HSV, staph aureus, MSSA, and MRSA PCRs which were negative.  Dermatology recommended surgical intervention. She has been on Bactrim since June as well as spironolactone.  She has not noticed any improvement on these medications.    For new patients, PFSH intake form from today was reviewed and confirmed.    OBGYN History:  She is a G 1 P 1001.  She does not use HRT. She does not have a history of abnormal pap smears.      Oncologic History:  Oncology/Hematology History    No history exists.         Past Medical History:   Diagnosis Date   • Acid reflux    • Anxiety    • Arthritis    • Asthma    • Bipolar 1 disorder (CMS/[facility])    • COPD (chronic obstructive pulmonary disease) (CMS/[facility])    • Depression    • Dyspareunia, female    • Fatigue    • Hx of gastroesophageal reflux (GERD) early 2007   • Migraines    • Pelvic pain    • Seasonal allergies        Past Surgical History:   Procedure Laterality Date   • HEMORRHOIDECTOMY      anal fissure, sphincter       MEDICATIONS: The current medication list was reviewed with  the patient and updated in the EMR this date per the Medical Assistant. Medication dosages and frequencies were confirmed to be accurate.      Allergies:  is allergic to norco [hydrocodone-acetaminophen].    Social History:   Social History     Socioeconomic History   • Marital status:      Spouse name: Not on file   • Number of children: 1   • Years of education: Not on file   • Highest education level: Not on file   Tobacco Use   • Smoking status: Current Every Day Smoker     Packs/day: 1.00     Years: 24.00     Pack years: 24.00     Types: Cigarettes     Start date: 1993   • Smokeless tobacco: Never Used   Substance and Sexual Activity   • Alcohol use: No     Comment: rare   • Drug use: No   • Sexual activity: Yes     Partners: Male     Birth control/protection: I.U.D.       Family History:    Family History   Problem Relation Age of Onset   • Hypertension Mother    • Obesity Mother    • Hypertension Father    • Arthritis Father    • Polymyalgia rheumatica Father    • Obesity Sister    • No Known Problems Brother    • No Known Problems Maternal Grandmother    • Hyperlipidemia Maternal Grandfather    • Hypertension Maternal Grandfather    • No Known Problems Paternal Grandmother    • No Known Problems Paternal Grandfather    • Melanoma Paternal Aunt    • Breast cancer Maternal Aunt    • Ovarian cancer Neg Hx    • Colon cancer Neg Hx        Health Maintenance:    Health Maintenance   Topic Date Due   • Pneumococcal Vaccine 0-64 (1 of 1 - PPSV23) 09/29/1988   • HEPATITIS C SCREENING  06/01/2017   • INFLUENZA VACCINE  08/01/2020   • TDAP/TD VACCINES (1 - Tdap) 03/02/2021 (Originally 9/29/2001)   • ANNUAL PHYSICAL  03/26/2021   • PAP SMEAR  07/23/2021     Review of Systems   Constitutional: Positive for fatigue. Negative for activity change, appetite change, chills, fever and unexpected weight change.   HENT: Positive for sinus pain. Negative for congestion, dental problem, hearing loss, mouth sores,  "nosebleeds, sore throat and trouble swallowing.    Eyes: Positive for itching. Negative for redness and visual disturbance.   Respiratory: Positive for shortness of breath. Negative for cough and wheezing.    Cardiovascular: Negative for chest pain, palpitations and leg swelling.   Gastrointestinal: Negative for abdominal distention, abdominal pain, blood in stool, constipation, diarrhea, nausea and vomiting.   Endocrine: Negative.  Negative for cold intolerance and heat intolerance.   Genitourinary: Positive for genital sores. Negative for difficulty urinating, dyspareunia, dysuria, frequency, hematuria, pelvic pain, urgency, vaginal bleeding, vaginal discharge and vaginal pain.   Musculoskeletal: Negative for arthralgias, back pain, gait problem, joint swelling and myalgias.   Skin: Positive for wound. Negative for rash.   Allergic/Immunologic: Positive for environmental allergies. Negative for food allergies and immunocompromised state.   Neurological: Positive for dizziness and headaches. Negative for seizures, syncope, weakness, light-headedness and numbness.   Hematological: Negative for adenopathy. Does not bruise/bleed easily.   Psychiatric/Behavioral: Negative for behavioral problems, confusion, dysphoric mood and sleep disturbance. The patient is nervous/anxious.        Physical Exam    Vitals:    09/25/20 1446   BP: 113/74   Pulse: 95   Resp: 18   Temp: 98.6 °F (37 °C)   TempSrc: Temporal   Weight: 76.7 kg (169 lb)   Height: 160 cm (62.99\")   PainSc:   4       Body mass index is 29.94 kg/m².    Wt Readings from Last 3 Encounters:   09/25/20 76.7 kg (169 lb)   09/15/20 76.7 kg (169 lb)   07/31/20 79.9 kg (176 lb 3.2 oz)         GENERAL: Alert, well-appearing female appearing her stated age who is in no apparent distress.   HEENT: Sclera anicteric. Head normocephalic, atraumatic. Mucus membranes moist.   NECK: Trachea midline, supple, without masses.  No thyromegaly.   BREASTS: Deferred  CARDIOVASCULAR: " Normal rate, regular rhythm, no murmurs, rubs, or gallops.  No peripheral edema.  RESPIRATORY: Clear to auscultation bilaterally, normal respiratory effort  BACK:  No CVA tenderness, no vertebral tenderness on palpation  GASTROINTESTINAL:  Abdomen is soft, non-tender, non-distended, no rebound or guarding, no masses, or hernias. No HSM.    SKIN:  Warm, dry, well-perfused.  All visible areas intact.  No rashes, lesions, ulcers.  PSYCHIATRIC: AO x3, with appropriate affect, normal thought processes.  NEUROLOGIC: No focal deficits.  Moves extremities well.  MUSCULOSKELETAL: Normal gait and station.   EXTREMITIES:   No cyanosis, clubbing, symmetric.  LYMPHATICS:  No cervical or inguinal adenopathy noted.     PELVIC exam:    She has well-healed scars on the labia majora bilaterally.  On the mons and near the thigh crease,  she has multiple exquisitely tender and erythematous lesions.  There is a 1 cm area on the patient's right that is actively draining.  There are a total of 4 areas that are acutely inflamed and tender to the touch.  Culture obtained from draining lesion.    ECOG PS 0    PROCEDURES: None    Diagnostic Data:      No Images in the past 120 days found..    Lab Results   Component Value Date    WBC 8.85 03/25/2020    HGB 11.3 (L) 03/25/2020    HCT 36.1 03/25/2020    MCV 80.4 03/25/2020     03/25/2020    NEUTROABS 5.66 03/25/2020    GLUCOSE 91 03/25/2020    BUN 9 03/25/2020    CREATININE 0.98 03/25/2020    EGFRIFNONA 64 03/25/2020     03/25/2020    K 4.7 03/25/2020     03/25/2020    CO2 21.5 (L) 03/25/2020    CALCIUM 9.3 03/25/2020    ALBUMIN 4.70 03/25/2020    AST 25 03/25/2020    ALT 25 03/25/2020    BILITOT 0.2 03/25/2020     No results found for:         Assessment/Plan   This is a 39 y.o. woman with chronic folliculitis versus hidradenitis suppurativa.  Encounter Diagnoses   Name Primary?   • Hidradenitis suppurativa Yes   • Folliculitis         Discussed case with infectious  disease physician on-call.  Plan to calm down this infection prior to proceeding with surgical intervention.  She is currently on Bactrim but this has not improved her symptoms.  Wound culture obtained today.  We will follow-up with patient with results.  She would like to plan for surgery the week of Thanksgiving.  Dr. Russell discussed with Dr. Gaona and typically IV antibiotics are not used for this condition, although they could be considered in the perioperative setting.    Patient was consented for excision of areas recurrent folliculitis/hidradenitis tracts.      Risks and benefits of surgery were discussed.  This included, but was not limited to, infection and bleeding like when the skin is cut; damage to surrounding structures; and incisional complications.  Risk of DVT was addressed for major surgeries.  Standard of care efforts to minimize these risks were reviewed.  Typical hospital stay and recovery were discussed as well as post-procedure precautions.  Surgical implications of chronic illnesses on recovery and surgical outcome were reviewed.     Pain medication regimen for postoperative care was discussed.  Typical regimen and avoidance of narcotics was discussed.  Patient was educated that other factors, such as existing narcotic use, can impact postoperative pain management.      Patient verbalized understanding of the plan including the risks and benefits.  Appropriate perioperative testing including laboratory evaluation, EKG as clinically indicated, chest x-ray as clinically indicated, and preadmission evaluation were all ordered as a part of this patient's care.    Patient is a smoker and is continuing to try to quit.  She was encouraged in this endeavor.  She understands that wound separation is common with vulvar cancer and smoking increases her risk of this complication.    Pain assessment was performed today as a part of patient’s care.  For patients with pain related to surgery,  gynecologic malignancy or cancer treatment, the plan is as noted in the assessment/plan.  For patients with pain not related to these issues, they are to seek any further needed care from a more appropriate provider, such as PCP.      Orders Placed This Encounter   Procedures   • Wound Culture - Wound, Pubis     Standing Status:   Future     Number of Occurrences:   1     Standing Expiration Date:   9/25/2021       FOLLOW UP: Surgery Thanksgiving.  Patient is to call in 1 month's time if she has not heard the results of her culture.      Patient was seen and examined with Dr. Jackson,  resident, who performed portions of the examination and documentation for this patient's care under my direct supervision.  I agree with the above documentation and plan.    Annamarie Russell MD  09/25/20  16:35 EDT

## 2020-09-25 ENCOUNTER — OFFICE VISIT (OUTPATIENT)
Dept: GYNECOLOGIC ONCOLOGY | Facility: CLINIC | Age: 40
End: 2020-09-25

## 2020-09-25 VITALS
HEART RATE: 95 BPM | SYSTOLIC BLOOD PRESSURE: 113 MMHG | HEIGHT: 63 IN | TEMPERATURE: 98.6 F | WEIGHT: 169 LBS | DIASTOLIC BLOOD PRESSURE: 74 MMHG | RESPIRATION RATE: 18 BRPM | BODY MASS INDEX: 29.95 KG/M2

## 2020-09-25 DIAGNOSIS — L73.2 HIDRADENITIS SUPPURATIVA: Primary | ICD-10-CM

## 2020-09-25 DIAGNOSIS — L73.9 FOLLICULITIS: ICD-10-CM

## 2020-09-25 DIAGNOSIS — Z72.0 TOBACCO ABUSE: ICD-10-CM

## 2020-09-25 PROCEDURE — 87070 CULTURE OTHR SPECIMN AEROBIC: CPT | Performed by: OBSTETRICS & GYNECOLOGY

## 2020-09-25 PROCEDURE — 87205 SMEAR GRAM STAIN: CPT | Performed by: OBSTETRICS & GYNECOLOGY

## 2020-09-25 PROCEDURE — 99215 OFFICE O/P EST HI 40 MIN: CPT | Performed by: OBSTETRICS & GYNECOLOGY

## 2020-09-25 PROCEDURE — 87185 SC STD ENZYME DETCJ PER NZM: CPT | Performed by: OBSTETRICS & GYNECOLOGY

## 2020-09-25 PROCEDURE — 87076 CULTURE ANAEROBE IDENT EACH: CPT | Performed by: OBSTETRICS & GYNECOLOGY

## 2020-09-28 ENCOUNTER — TELEPHONE (OUTPATIENT)
Dept: GYNECOLOGIC ONCOLOGY | Facility: CLINIC | Age: 40
End: 2020-09-28

## 2020-09-28 LAB
BACTERIA SPEC AEROBE CULT: ABNORMAL
GRAM STN SPEC: ABNORMAL
GRAM STN SPEC: ABNORMAL

## 2020-09-28 NOTE — TELEPHONE ENCOUNTER
Called pt to tell her wound culture was without growth and to continue antibiotic regimen until her surgery.

## 2020-09-29 ENCOUNTER — DOCUMENTATION (OUTPATIENT)
Dept: GYNECOLOGIC ONCOLOGY | Facility: CLINIC | Age: 40
End: 2020-09-29

## 2020-09-29 NOTE — PROGRESS NOTES
Culture reviewed.  This is a normal skin jamey.  We will plan on ongoing antibiotics (Bactrim versus Keflex) with goal of surgery is planned around Thanksgiving.  This bacteria is commonly seen with acne vulgaris.

## 2020-09-30 DIAGNOSIS — L73.9 FOLLICULITIS: ICD-10-CM

## 2020-09-30 DIAGNOSIS — L73.2 HIDRADENITIS SUPPURATIVA: Primary | ICD-10-CM

## 2020-09-30 RX ORDER — CELECOXIB 100 MG/1
200 CAPSULE ORAL ONCE
Status: CANCELLED | OUTPATIENT
Start: 2020-09-30 | End: 2020-09-30

## 2020-09-30 RX ORDER — ACETAMINOPHEN 325 MG/1
650 TABLET ORAL ONCE
Status: CANCELLED | OUTPATIENT
Start: 2020-09-30 | End: 2020-09-30

## 2020-10-07 ENCOUNTER — OFFICE VISIT (OUTPATIENT)
Dept: GYNECOLOGIC ONCOLOGY | Facility: CLINIC | Age: 40
End: 2020-10-07

## 2020-10-07 ENCOUNTER — TELEPHONE (OUTPATIENT)
Dept: GYNECOLOGIC ONCOLOGY | Facility: CLINIC | Age: 40
End: 2020-10-07

## 2020-10-07 ENCOUNTER — TELEPHONE (OUTPATIENT)
Dept: ONCOLOGY | Facility: CLINIC | Age: 40
End: 2020-10-07

## 2020-10-07 VITALS
RESPIRATION RATE: 17 BRPM | WEIGHT: 169 LBS | SYSTOLIC BLOOD PRESSURE: 125 MMHG | HEART RATE: 104 BPM | HEIGHT: 63 IN | BODY MASS INDEX: 29.95 KG/M2 | TEMPERATURE: 98.9 F | DIASTOLIC BLOOD PRESSURE: 63 MMHG | OXYGEN SATURATION: 95 %

## 2020-10-07 DIAGNOSIS — L73.2 HIDRADENITIS SUPPURATIVA: Primary | ICD-10-CM

## 2020-10-07 DIAGNOSIS — L73.9 FOLLICULITIS: ICD-10-CM

## 2020-10-07 DIAGNOSIS — Z72.0 TOBACCO ABUSE: ICD-10-CM

## 2020-10-07 PROCEDURE — 99213 OFFICE O/P EST LOW 20 MIN: CPT | Performed by: OBSTETRICS & GYNECOLOGY

## 2020-10-07 RX ORDER — DOXYCYCLINE HYCLATE 100 MG
100 TABLET ORAL 2 TIMES DAILY
Qty: 28 TABLET | Refills: 0 | Status: SHIPPED | OUTPATIENT
Start: 2020-10-07 | End: 2020-10-21

## 2020-10-07 NOTE — PROGRESS NOTES
Jesica Aguilera  [id number]  1980      Reason for visit: Chronic folliculitis, possible hidradenitis suppurativa    History of present illness:  The patient is a 40 y.o. female who presents today for treatment and evaluation of the above issues.    Ms. Aguilera turns today regarding recurrent skin infections in the groin.  She reports that the area of concern from last time continues to bother her.  She feels that it has worsened.  He continues to drain and be extremely tender to the touch.  She has been on the high dose of Bactrim now for 10 days in addition to spironolactone.  She has not noticed any improvement on these.  She also reports that she is using heat and baths at home without significant improvement.    OBGYN History:  She is a G 1 P 1001.  She does not use HRT. She does not have a history of abnormal pap smears.      Oncologic History:  Oncology/Hematology History    No history exists.         Past Medical History:   Diagnosis Date   • Acid reflux    • Anxiety    • Arthritis    • Asthma    • Bipolar 1 disorder (CMS/[facility])    • COPD (chronic obstructive pulmonary disease) (CMS/[facility])    • Depression    • Dyspareunia, female    • Fatigue    • Hx of gastroesophageal reflux (GERD) early 2007   • Migraines    • Pelvic pain    • Seasonal allergies        Past Surgical History:   Procedure Laterality Date   • HEMORRHOIDECTOMY      anal fissure, sphincter       MEDICATIONS: The current medication list was reviewed with the patient and updated in the EMR this date per the Medical Assistant. Medication dosages and frequencies were confirmed to be accurate.      Allergies:  is allergic to norco [hydrocodone-acetaminophen].    Social History:   Social History     Socioeconomic History   • Marital status:      Spouse name: Not on file   • Number of children: 1   • Years of education: Not on file   • Highest education level: Not on file   Tobacco Use   • Smoking status: Current Every Day Smoker      Packs/day: 1.00     Years: 24.00     Pack years: 24.00     Types: Cigarettes     Start date: 1993   • Smokeless tobacco: Never Used   Substance and Sexual Activity   • Alcohol use: No     Comment: rare   • Drug use: No   • Sexual activity: Yes     Partners: Male     Birth control/protection: I.U.D.       Family History:    Family History   Problem Relation Age of Onset   • Hypertension Mother    • Obesity Mother    • Hypertension Father    • Arthritis Father    • Polymyalgia rheumatica Father    • Obesity Sister    • No Known Problems Brother    • No Known Problems Maternal Grandmother    • Hyperlipidemia Maternal Grandfather    • Hypertension Maternal Grandfather    • No Known Problems Paternal Grandmother    • No Known Problems Paternal Grandfather    • Melanoma Paternal Aunt    • Breast cancer Maternal Aunt    • Ovarian cancer Neg Hx    • Colon cancer Neg Hx        Health Maintenance:    Health Maintenance   Topic Date Due   • Pneumococcal Vaccine 0-64 (1 of 1 - PPSV23) 09/29/1988   • HEPATITIS C SCREENING  06/01/2017   • INFLUENZA VACCINE  08/01/2020   • TDAP/TD VACCINES (1 - Tdap) 03/02/2021 (Originally 9/29/2001)   • ANNUAL PHYSICAL  03/26/2021   • PAP SMEAR  07/23/2021     Review of Systems   Constitutional: Positive for fatigue. Negative for activity change, appetite change, chills, fever and unexpected weight change.   HENT: Positive for sinus pain. Negative for congestion, dental problem, hearing loss, mouth sores, nosebleeds, sore throat and trouble swallowing.    Eyes: Positive for itching. Negative for redness and visual disturbance.   Respiratory: Positive for shortness of breath. Negative for cough and wheezing.    Cardiovascular: Negative for chest pain, palpitations and leg swelling.   Gastrointestinal: Negative for abdominal distention, abdominal pain, blood in stool, constipation, diarrhea, nausea and vomiting.   Endocrine: Negative.  Negative for cold intolerance and heat intolerance.  "  Genitourinary: Positive for genital sores. Negative for difficulty urinating, dyspareunia, dysuria, frequency, hematuria, pelvic pain, urgency, vaginal bleeding, vaginal discharge and vaginal pain.   Musculoskeletal: Negative for arthralgias, back pain, gait problem, joint swelling and myalgias.   Skin: Positive for wound. Negative for rash.   Allergic/Immunologic: Positive for environmental allergies. Negative for food allergies and immunocompromised state.   Neurological: Positive for dizziness and headaches. Negative for seizures, syncope, weakness, light-headedness and numbness.   Hematological: Negative for adenopathy. Does not bruise/bleed easily.   Psychiatric/Behavioral: Negative for behavioral problems, confusion, dysphoric mood and sleep disturbance. The patient is nervous/anxious.        Physical Exam    Vitals:    10/07/20 1520   BP: 125/63   Pulse: 104   Resp: 17   Temp: 98.9 °F (37.2 °C)   TempSrc: Temporal   SpO2: 95%   Weight: 76.7 kg (169 lb)   Height: 160 cm (62.99\")   PainSc:   8       Body mass index is 29.94 kg/m².    Wt Readings from Last 3 Encounters:   10/07/20 76.7 kg (169 lb)   09/25/20 76.7 kg (169 lb)   09/15/20 76.7 kg (169 lb)         GENERAL: Alert, well-appearing female appearing her stated age who is in no apparent distress.   HEENT: Sclera anicteric. Head normocephalic, atraumatic. Mucus membranes moist.   NECK: Trachea midline, supple, without masses.  No thyromegaly.   BREASTS: Deferred  CARDIOVASCULAR: Normal rate, regular rhythm, no murmurs, rubs, or gallops.  No peripheral edema.  RESPIRATORY: Clear to auscultation bilaterally, normal respiratory effort  BACK:  No CVA tenderness, no vertebral tenderness on palpation  GASTROINTESTINAL:  Abdomen is soft, non-tender, non-distended, no rebound or guarding, no masses, or hernias. No HSM.    SKIN:  Warm, dry, well-perfused.  All visible areas intact.  No rashes, lesions, ulcers.  PSYCHIATRIC: AO x3, with appropriate affect, normal " thought processes.  NEUROLOGIC: No focal deficits.  Moves extremities well.  MUSCULOSKELETAL: Normal gait and station.   EXTREMITIES:   No cyanosis, clubbing, symmetric.  LYMPHATICS:  No cervical or inguinal adenopathy noted.     PELVIC exam:    She has well-healed scars on the labia majora bilaterally.  On the mons and near right the thigh crease,  she has continues to have a 2 cm exquisitely tender and erythematous lesions.  It is slightly ulcerated but there is no active drainage. Remaining lesions appear improved.    ECOG PS 0    PROCEDURES: None    Diagnostic Data:      No Images in the past 120 days found..    Lab Results   Component Value Date    WBC 8.85 03/25/2020    HGB 11.3 (L) 03/25/2020    HCT 36.1 03/25/2020    MCV 80.4 03/25/2020     03/25/2020    NEUTROABS 5.66 03/25/2020    GLUCOSE 91 03/25/2020    BUN 9 03/25/2020    CREATININE 0.98 03/25/2020    EGFRIFNONA 64 03/25/2020     03/25/2020    K 4.7 03/25/2020     03/25/2020    CO2 21.5 (L) 03/25/2020    CALCIUM 9.3 03/25/2020    ALBUMIN 4.70 03/25/2020    AST 25 03/25/2020    ALT 25 03/25/2020    BILITOT 0.2 03/25/2020     No results found for:         Assessment/Plan   This is a 40 y.o. woman with chronic folliculitis versus hidradenitis suppurativa.  No diagnosis found.     Discussed with patient that nonsurgical management options are somewhat limited at this time.  She can continue what she is currently doing.  Offered trial of alternative antibiotic to Bactrim.  She recently filled this prescription so is hesitant to try another right away.  We will send 2-week course of doxycycline to her pharmacy so she can try it should her symptoms worsen.  Offered to move up patient's surgery should an earlier date be more convenient for her.  She will work on this and let us know.  Culture obtained at last visit grew Propionibacterium which is skin jamey consistent with acne vulgaris.      Pain assessment was performed today as a part  of patient’s care.  For patients with pain related to surgery, gynecologic malignancy or cancer treatment, the plan is as noted in the assessment/plan.  For patients with pain not related to these issues, they are to seek any further needed care from a more appropriate provider, such as PCP.      No orders of the defined types were placed in this encounter.      FOLLOW UP: She will call us if she wants to move up her surgery.    Patient was seen and examined with Dr. Jackson,  resident, who performed portions of the examination and documentation for this patient's care under my direct supervision.  I agree with the above documentation and plan.    Annamarie Russell MD  10/08/20  20:01 EDT

## 2020-10-07 NOTE — TELEPHONE ENCOUNTER
Patient called she would like a call back regarding her upcoming surgery    Best call back number [phone]

## 2020-10-07 NOTE — TELEPHONE ENCOUNTER
I called pt.  The areas of concern are not getting better but worse.  They have become more painful, have grown in size and there is an area that has turned black.  Advised she be seen in clinic today.  She agreed and was added to schedule today.

## 2020-11-02 ENCOUNTER — TELEPHONE (OUTPATIENT)
Dept: GYNECOLOGIC ONCOLOGY | Facility: CLINIC | Age: 40
End: 2020-11-02

## 2020-11-02 ENCOUNTER — PATIENT EDUCATION (SURGERY INSTRUCTIONS) (OUTPATIENT)
Dept: GYNECOLOGIC ONCOLOGY | Facility: CLINIC | Age: 40
End: 2020-11-02

## 2020-11-02 NOTE — TELEPHONE ENCOUNTER
PATIENT WAITING ON A CALL FROM [facility], SPOKE WITH HER EARLIER, AND SAID SHE WOULD CALL HER RIGHT BACK BUT SHE HAS NOT BEEN CALLED BACK, HAS QUESTIONS ABOUT HER SURGERY AND ABOUT THE COVID TEST THAT HAS BEEN SCHEDULED, PLEASE ADVISE?        PT CALL BACK #[phone]

## 2020-11-02 NOTE — PATIENT INSTRUCTIONS
Outpatient Pre-op Patient Education  [facility]       Jesica Aguilera  [id number]  1980    SURGEON: Dr. Russell    Surgery Coordinator : Gricelda   If you have any questions     Appointment  1. You have Covid Testing on 11/22/2020 at 1:30 pm. This is located at [facility] located on the second floor. [address], [address] [place] [address]. Upon arrival patients must park in the designated parking area. Once parked, stay in your car and call [phone]. You must wear a mask to this appointment.    2. Your surgery has been scheduled on 11/24/2020 at [facility] located at [address]. You will need to be there at 11:00 am.   ( this surgery is subject to move depending on cancellations )        The Day(s) Before Surgery  1.  Do not drink alcohol or smoke.     2.  Do not take vitamins or aspirin one week before surgery.       3.  If you are ill on the days leading up to your surgery, please call our office.      4.  If you are using medications for diabetes, call the physician who manages these and get instructions on how they should be taken before and after surgery.    5.  Nothing to eat or drink after midnight on 11/23/2020.      6.  Please make prior arrangements for someone to drive you home after your procedure        The Day of Surgery  1.  Do not eat, drink, or chew gum.     2.  On the morning of your surgery, you may take her prescription medications with a sip of water. Bring all medication with you to the surgery center. (Diabetic patients should bring insulin if instructed to do so by their diabetes managing physician).   3. Bathe or shower the morning of your surgery. Do not use powders, lotions, or creams. Deodorants are okay.     4. Wear loose, comfortable clothing.     5. Bring holders for glasses, contacts, or dentures.      6. Bring any required payment and forms, including  insurance cards. Leave all money and valuables at home.        Post-surgery Instructions   1.  For the first 24 hours, rest and take periodic deep breaths to remove anesthetic agents from your body.    2.  Follow any specific instructions relevant to your particular surgery.      3.  Limit activity to avoid stress to the surgical site.       4.  Keep all dressings dry. Shower or bathe as instructed by your doctor.      5.  Drink and eat light foods. Remain on liquids alone only if nausea and vomiting occur. Return to your regular diet gradually, as tolerance allows.     6. Avoid alcohol for at least 24 hours.       7.  Take prescription pain medication as directed and with food.       8.  Call our office after discharge from the surgery center to make your post-op appointment.         In Case of Emergency:       Call our office if you experience any of the following:    Excessive drainage, bleeding, swelling, or redness at the incision site   Severe pain not eased by pain medication  Temperature above 101    Persistent nausea or vomiting    Skin rash or general body itching

## 2020-11-03 NOTE — TELEPHONE ENCOUNTER
I called patient and spoke with her yesterday at after 4:15 pm.      We discussed all her surgery dates and times, instructions and she inquired about financial assistance.       She also wanted an estimate on her OOP expense or copay for upcoming surgery cpt code 03473    I have forwarded message to cathy Stein.

## 2020-11-03 NOTE — TELEPHONE ENCOUNTER
Spoke with patient yesterday as well as thins morning. Gave her contact info for [employer] as she will be required to pay them at time of service.

## 2020-11-22 ENCOUNTER — APPOINTMENT (OUTPATIENT)
Dept: PREADMISSION TESTING | Facility: HOSPITAL | Age: 40
End: 2020-11-22

## 2020-11-22 PROCEDURE — U0004 COV-19 TEST NON-CDC HGH THRU: HCPCS

## 2020-11-22 PROCEDURE — C9803 HOPD COVID-19 SPEC COLLECT: HCPCS

## 2020-11-23 LAB — SARS-COV-2 RNA RESP QL NAA+PROBE: NOT DETECTED

## 2020-11-24 ENCOUNTER — OUTSIDE FACILITY SERVICE (OUTPATIENT)
Dept: GYNECOLOGIC ONCOLOGY | Facility: CLINIC | Age: 40
End: 2020-11-24

## 2020-11-24 ENCOUNTER — LAB REQUISITION (OUTPATIENT)
Dept: LAB | Facility: HOSPITAL | Age: 40
End: 2020-11-24

## 2020-11-24 DIAGNOSIS — L73.2 HIDRADENITIS SUPPURATIVA: ICD-10-CM

## 2020-11-24 PROCEDURE — 56620 VULVECTOMY SIMPLE PARTIAL: CPT | Performed by: OBSTETRICS & GYNECOLOGY

## 2020-11-24 PROCEDURE — 88305 TISSUE EXAM BY PATHOLOGIST: CPT | Performed by: OBSTETRICS & GYNECOLOGY

## 2020-11-24 RX ORDER — OXYCODONE HYDROCHLORIDE 5 MG/1
5 TABLET ORAL EVERY 6 HOURS PRN
Qty: 10 TABLET | Refills: 0 | Status: SHIPPED | OUTPATIENT
Start: 2020-11-24 | End: 2020-12-01

## 2020-11-24 RX ORDER — DOCUSATE SODIUM 250 MG
250 CAPSULE ORAL 2 TIMES DAILY
Qty: 60 CAPSULE | Refills: 3 | Status: SHIPPED | OUTPATIENT
Start: 2020-11-24 | End: 2020-12-18

## 2020-11-24 RX ORDER — ACETAMINOPHEN 325 MG/1
650 TABLET ORAL EVERY 6 HOURS PRN
Qty: 60 TABLET | Refills: 3 | Status: SHIPPED | OUTPATIENT
Start: 2020-11-24

## 2020-11-24 RX ORDER — IBUPROFEN 600 MG/1
600 TABLET ORAL EVERY 6 HOURS PRN
Qty: 30 TABLET | Refills: 3 | Status: SHIPPED | OUTPATIENT
Start: 2020-11-24

## 2020-11-24 RX ORDER — POLYETHYLENE GLYCOL 3350 17 G/17G
17 POWDER, FOR SOLUTION ORAL DAILY
Qty: 250 G | Refills: 1 | Status: SHIPPED | OUTPATIENT
Start: 2020-11-24 | End: 2020-12-18

## 2020-11-24 RX ORDER — ONDANSETRON 4 MG/1
4 TABLET, FILM COATED ORAL EVERY 6 HOURS PRN
Qty: 10 TABLET | Refills: 0 | Status: SHIPPED | OUTPATIENT
Start: 2020-11-24 | End: 2020-12-18

## 2020-11-30 ENCOUNTER — TELEPHONE (OUTPATIENT)
Dept: GYNECOLOGIC ONCOLOGY | Facility: CLINIC | Age: 40
End: 2020-11-30

## 2020-11-30 LAB
CYTO UR: NORMAL
LAB AP CASE REPORT: NORMAL
LAB AP CLINICAL INFORMATION: NORMAL
PATH REPORT.FINAL DX SPEC: NORMAL
PATH REPORT.GROSS SPEC: NORMAL

## 2020-11-30 NOTE — TELEPHONE ENCOUNTER
Caller: Jesica Aguilera    Relationship to pt: self    Best call back # [phone]    Pt called to schedule her post op appt for this week. Her surgery with Dr Russell was on 11/24/20.

## 2020-12-01 ENCOUNTER — OFFICE VISIT (OUTPATIENT)
Dept: GYNECOLOGIC ONCOLOGY | Facility: CLINIC | Age: 40
End: 2020-12-01

## 2020-12-01 VITALS
TEMPERATURE: 95.7 F | HEART RATE: 110 BPM | BODY MASS INDEX: 29.95 KG/M2 | RESPIRATION RATE: 18 BRPM | SYSTOLIC BLOOD PRESSURE: 140 MMHG | WEIGHT: 169 LBS | DIASTOLIC BLOOD PRESSURE: 82 MMHG | HEIGHT: 63 IN

## 2020-12-01 DIAGNOSIS — Z98.890 POST-OPERATIVE STATE: Primary | ICD-10-CM

## 2020-12-01 PROCEDURE — 99024 POSTOP FOLLOW-UP VISIT: CPT | Performed by: NURSE PRACTITIONER

## 2020-12-01 NOTE — PROGRESS NOTES
"GYN ONCOLOGY POST-OP NOTE    Jesica Aguilera  [id number]  1980      Reason for Visit: Postoperative evaluation    History of Present Illness:  Patient is a very pleasant 40 y.o. woman who presents for a post operative evaluation 1 week status post vulvar WLE for hydradenitis suppurativa performed on 11/24/2020.      Surgery and hospital course were uncomplicated.  Today, patient notes normal bowel and bladder function.  Her pain is well controlled. She is not needing narcotic pain medication, using ibuprofen and tylenol PRN.     Past Medical History, Past Surgical History, Social History, Family History have been reviewed and are without significant changes except as mentioned.    Review of Systems   All other systems were reviewed and are negative except as mentioned above.    Medications:  The current medication list was reviewed in the EMR    ALLERGIES:    Allergies   Allergen Reactions   • Norco [Hydrocodone-Acetaminophen] Itching           /82   Pulse 110   Temp 95.7 °F (35.4 °C) (Temporal)   Resp 18   Ht 160 cm (62.99\")   Wt 76.7 kg (169 lb)   BMI 29.94 kg/m²        Physical Exam  Constitutional:  Patient is a pleasant woman in no acute distress.  Gastrointestinal: Abdomen is soft and appropriately tender.  There is no rebound or guarding.   Extremities:  Bilateral lower extremities are non-tender.  Gynecologic:External genitalia are free from lesion.  All 5 incision sites healing very well : 2 right mons pubis, 2 left mons pubis, and 1 at left vulva/lower buttock. No incisional drainage, induration, or evidence of infection. All sites well approximated. There was no significant tenderness.      PATHOLOGY:  Final Diagnosis     1. RIGHT ANTERIOR VULVA, PARTIAL EXCISION:               Hidradenitis suppurativa.   2. ANTERIOR VULVAR, PARTIAL EXCISION:  Keratinous follicular plugging compatible with hidradenitis suppurativa.  3. RIGHT ANTERIOR VULVA #2:               Hidradenitis suppurativa.   " 4. LEFT ANTERIOR VULVA #2:               Hidradenitis suppurativa.   5. LEFT POSTERIOR VULVA, EXCISION:               Hidradenitis suppurativa.           ASSESSMENT/PLAN:  Jesica Aguilera returns for a post-operative evaluation today.  All pathology reports were discussed with the patient.      Overall, the patient is very pleased with her care.  I recommended continuation of post operative precautions as discussed.     She is to follow-up with Dr. Russell for post-op in 2 weeks.       Electronically signed by JAXON Becerra on 12/01/20 at 15:07 EST

## 2020-12-17 NOTE — PROGRESS NOTES
"Jesica Aguilera  [id number]  1980      Reason for Visit:  Hydradenitis suppurative, Postoperative evaluation    History of Present Illness:  Patient is a very pleasant 40 y.o. woman who presents for a post operative evaluation status post vulvar WLE for hydradenitis suppurativa performed on 11/24/2020     Surgery and hospital course were uncomplicated.  Today she is doing well, and patient notes normal bowel and bladder function.  Her pain is well controlled when she is wearing loose clothing.  Jeans have been very painful still. She is no longer taking Spironolactone or Bactrim.  She has questions about resuming normal activities such as vulvar waxing.     Past Medical History, Past Surgical History, Social History, Family History have been reviewed and are without significant changes except as mentioned.    Review of Systems   All other systems were reviewed and are negative except as mentioned above.    Medications:  The current medication list was reviewed in the EMR    ALLERGIES:    Allergies   Allergen Reactions   • Norco [Hydrocodone-Acetaminophen] Itching           /74   Pulse 81   Temp 96 °F (35.6 °C) (Temporal)   Resp 17   Ht 160 cm (62.99\")   Wt 81.6 kg (180 lb)   BMI 31.89 kg/m²        Physical Exam  Constitutional:  Patient is a pleasant woman in no acute distress.  Gastrointestinal: Abdomen is soft and appropriately tender.  There is no mass palpated.  There is no rebound or guarding.   Extremities:  Bilateral lower extremities are non-tender.  Gynecologic:External genitalia with normal post-operative changes.  Well healing inguinal and mons incisions with mild edema.  Posterior left buttock incision healing well with some fullness palpated.     PATHOLOGY:  Clinical Information    The working history is hidradenitis suppurativa (L73.2).       Final Diagnosis     1. RIGHT ANTERIOR VULVA, PARTIAL EXCISION:               Hidradenitis suppurativa.   2. ANTERIOR VULVAR, PARTIAL " EXCISION:  Keratinous follicular plugging compatible with hidradenitis suppurativa.  3. RIGHT ANTERIOR VULVA #2:               Hidradenitis suppurativa.   4. LEFT ANTERIOR VULVA #2:               Hidradenitis suppurativa.   5. LEFT POSTERIOR VULVA, EXCISION:               Hidradenitis suppurativa.              ASSESSMENT/PLAN:  Jesica Aguilera returns for a post-operative evaluation today.  All pathology reports were discussed with the patient. Healing well and advised waiting until scabs have resolved and wounds are more healed before resuming waxing.       Overall, the patient is very pleased with her care.  Her pain has improved.  I recommended continuation of post operative precautions as discussed.     She is to follow-up as needed    Patient was seen and examined with Dr. Almaguer,  resident, who performed portions of the examination and documentation for this patient's care under my direct supervision.  I agree with the above documentation and plan.    Annamarie Russell MD  12/19/20  18:57 EST

## 2020-12-18 ENCOUNTER — OFFICE VISIT (OUTPATIENT)
Dept: GYNECOLOGIC ONCOLOGY | Facility: CLINIC | Age: 40
End: 2020-12-18

## 2020-12-18 VITALS
TEMPERATURE: 96 F | HEART RATE: 81 BPM | SYSTOLIC BLOOD PRESSURE: 125 MMHG | RESPIRATION RATE: 17 BRPM | DIASTOLIC BLOOD PRESSURE: 74 MMHG | WEIGHT: 180 LBS | BODY MASS INDEX: 31.89 KG/M2 | HEIGHT: 63 IN

## 2020-12-18 DIAGNOSIS — Z98.890 POST-OPERATIVE STATE: Primary | ICD-10-CM

## 2020-12-18 PROCEDURE — 99024 POSTOP FOLLOW-UP VISIT: CPT | Performed by: OBSTETRICS & GYNECOLOGY

## 2021-01-22 ENCOUNTER — TELEPHONE (OUTPATIENT)
Dept: GYNECOLOGIC ONCOLOGY | Facility: CLINIC | Age: 41
End: 2021-01-22

## 2021-01-22 NOTE — TELEPHONE ENCOUNTER
Caller: ALLY VENTURA    Relationship to patient: SELF    Best call back number: [phone]    PT IS CALLING TO SCHEDULE AN APPT WITH DR LOWE AS SOON AS POSSIBLE.

## 2021-01-25 NOTE — TELEPHONE ENCOUNTER
Patient states spots are back. Dr. Russell told her if they reappeared, she should get back in with her. States that the spots are right at incision and painful. She also thinks she had one rupture over the weekend. Appt given for Wednesday

## 2021-01-25 NOTE — PROGRESS NOTES
Jesica Aguilera  [id number]  1980      Reason for visit:  hidradenitis suppurativa    History of present illness:  The patient is a 40 y.o. female who presents today for treatment and evaluation of the above issues.    She underwent wide local excision on 11/24/2020 for hidradenitis suppurativa.  She healed well from surgery and reports 2 months without symptoms.  In the past week she noted recurrence of painful lesions on the vulva. She started to notice pain and erythema on Wednesday. On Friday she noticed draining from the left vulva. Discharge was initially foul smelling and yellow but is now clear. She thinks there are 3-4 new lesions. These are painful and she has a hard time sitting or wearing clothes that touch the areas. She reports she is otherwise doing well.  She denies fevers or chills. Denies changes in bowel or bladder function.  She has not been taking antibiotics or spironolactone.    OBGYN History:  She is a G 1 P 1001.  She does not use HRT. She does not have a history of abnormal pap smears.      Oncologic History:  Oncology/Hematology History    No history exists.         Past Medical History:   Diagnosis Date   • Acid reflux    • Anxiety    • Arthritis    • Asthma    • Bipolar 1 disorder (CMS/[facility])    • COPD (chronic obstructive pulmonary disease) (CMS/[facility])    • Depression    • Dyspareunia, female    • Fatigue    • Hx of gastroesophageal reflux (GERD) early 2007   • Migraines    • Pelvic pain    • Seasonal allergies        Past Surgical History:   Procedure Laterality Date   • HEMORRHOIDECTOMY      anal fissure, sphincter   • VULVA SURGERY         MEDICATIONS: The current medication list was reviewed with the patient and updated in the EMR this date per the Medical Assistant. Medication dosages and frequencies were confirmed to be accurate.      Allergies:  is allergic to norco [hydrocodone-acetaminophen].    Social History:   Social History     Socioeconomic History   • Marital status:       Spouse name: Not on file   • Number of children: 1   • Years of education: Not on file   • Highest education level: Not on file   Tobacco Use   • Smoking status: Current Every Day Smoker     Packs/day: 1.00     Years: 24.00     Pack years: 24.00     Types: Cigarettes     Start date: 1993   • Smokeless tobacco: Never Used   Substance and Sexual Activity   • Alcohol use: No     Comment: rare   • Drug use: No   • Sexual activity: Yes     Partners: Male     Birth control/protection: I.U.D.       Family History:    Family History   Problem Relation Age of Onset   • Hypertension Mother    • Obesity Mother    • Hypertension Father    • Arthritis Father    • Polymyalgia rheumatica Father    • Obesity Sister    • No Known Problems Brother    • No Known Problems Maternal Grandmother    • Hyperlipidemia Maternal Grandfather    • Hypertension Maternal Grandfather    • No Known Problems Paternal Grandmother    • No Known Problems Paternal Grandfather    • Melanoma Paternal Aunt    • Breast cancer Maternal Aunt    • Ovarian cancer Neg Hx    • Colon cancer Neg Hx        Health Maintenance:    Health Maintenance   Topic Date Due   • Pneumococcal Vaccine 0-64 (1 of 1 - PPSV23) 09/29/1988   • HEPATITIS C SCREENING  06/01/2017   • TDAP/TD VACCINES (1 - Tdap) 03/02/2021 (Originally 9/29/2001)   • ANNUAL PHYSICAL  03/26/2021   • PAP SMEAR  07/23/2021   • INFLUENZA VACCINE  Completed   • MENINGOCOCCAL VACCINE  Aged Out     Review of Systems   Constitutional: Positive for fatigue. Negative for activity change, appetite change, chills, fever and unexpected weight change.   HENT: Negative for congestion, dental problem, hearing loss, mouth sores, nosebleeds, sinus pain, sore throat and trouble swallowing.    Eyes: Negative for redness, itching and visual disturbance.   Respiratory: Negative for cough, shortness of breath and wheezing.    Cardiovascular: Negative for chest pain, palpitations and leg swelling.   Gastrointestinal:  "Negative for abdominal distention, abdominal pain, blood in stool, constipation, diarrhea, nausea and vomiting.   Endocrine: Negative.  Negative for cold intolerance and heat intolerance.   Genitourinary: Positive for genital sores. Negative for difficulty urinating, dyspareunia, dysuria, frequency, hematuria, pelvic pain, urgency, vaginal bleeding, vaginal discharge and vaginal pain.   Musculoskeletal: Negative for arthralgias, back pain, gait problem, joint swelling and myalgias.   Skin: Positive for wound. Negative for rash.   Allergic/Immunologic: Positive for environmental allergies. Negative for food allergies and immunocompromised state.   Neurological: Positive for dizziness and headaches. Negative for seizures, syncope, weakness, light-headedness and numbness.   Hematological: Negative for adenopathy. Does not bruise/bleed easily.   Psychiatric/Behavioral: Negative for behavioral problems, confusion, dysphoric mood and sleep disturbance. The patient is nervous/anxious.        Physical Exam    Vitals:    01/27/21 1425   BP: 142/83   Pulse: 87   Resp: 16   Temp: 97.8 °F (36.6 °C)   TempSrc: Temporal   SpO2: 96%   Weight: 82.4 kg (181 lb 9.6 oz)   Height: 160 cm (62.99\")   PainSc:   5       Body mass index is 32.18 kg/m².    Wt Readings from Last 3 Encounters:   01/27/21 82.4 kg (181 lb 9.6 oz)   12/18/20 81.6 kg (180 lb)   12/01/20 76.7 kg (169 lb)         GENERAL: Alert, well-appearing female appearing her stated age who is in no apparent distress.   HEENT: Sclera anicteric. Head normocephalic, atraumatic. Mucus membranes moist.   NECK: Trachea midline, supple, without masses.  No thyromegaly.   BREASTS: Deferred  CARDIOVASCULAR: Normal rate, regular rhythm, no murmurs, rubs, or gallops.  No peripheral edema.  RESPIRATORY: Clear to auscultation bilaterally, normal respiratory effort  BACK:  No CVA tenderness, no vertebral tenderness on palpation  GASTROINTESTINAL:  Abdomen is soft, non-tender, non-distended, " no rebound or guarding, no masses, or hernias. No HSM.    SKIN:  Warm, dry, well-perfused.  All visible areas intact.  No rashes, lesions, ulcers.  PSYCHIATRIC: AO x3, with appropriate affect, normal thought processes.  NEUROLOGIC: No focal deficits.  Moves extremities well.  MUSCULOSKELETAL: Normal gait and station.   EXTREMITIES:   No cyanosis, clubbing, symmetric.  LYMPHATICS:  No cervical or inguinal adenopathy noted.     PELVIC exam:  She has well-healed scars throughout the labia majora and left posterior vulva. 1-2cm erythematous lesion ulcerated with drainage on left mons. 2cm area of erythema with 5mm unruptured head of purulence on left mons,  areas are exquisitely tender to touch. Remaining lesions appear improved (a total of 2 lesions actively draining/inflamed).    ECOG PS 0    PROCEDURES: None    Diagnostic Data:      No Images in the past 120 days found..        Lab Results   Component Value Date    WBC 8.85 03/25/2020    HGB 11.3 (L) 03/25/2020    HCT 36.1 03/25/2020    MCV 80.4 03/25/2020     03/25/2020    NEUTROABS 5.66 03/25/2020    GLUCOSE 91 03/25/2020    BUN 9 03/25/2020    CREATININE 0.98 03/25/2020    EGFRIFNONA 64 03/25/2020     03/25/2020    K 4.7 03/25/2020     03/25/2020    CO2 21.5 (L) 03/25/2020    CALCIUM 9.3 03/25/2020    ALBUMIN 4.70 03/25/2020    AST 25 03/25/2020    ALT 25 03/25/2020    BILITOT 0.2 03/25/2020     No results found for:         Assessment/Plan   This is a 40 y.o. woman with hidradenitis suppurativa.  Encounter Diagnosis   Name Primary?   • Hidradenitis suppurativa Yes      Hidradenitis Suppurativa  Recurrent lesions on the bilateral mons with pain and drainage. Options discussed, patient desires to avoid surgery if possible, noting financial concerns. She would like to try a course of antibiotics to help control lesions. She has previously used Bactrim and Doxycyline without success but most recent culture was sensitive to clindamycin. 10 day  course on clindamycin sent to pharmacy. Instructed patient to take probiotics daily with Clindamycin to prevent C. Diff. She will call or send a [employer] message in 2 weeks to follow up on her symptoms.  She would like to postpone any surgical interventions till the late summer or fall due to financial concerns and taking a new position at her job with a 6-month probation.  Discussed that she can do a nurse practitioner visit in the future if copay is an issue.     Pain assessment was performed today as a part of patient’s care.  For patients with pain related to surgery, gynecologic malignancy or cancer treatment, the plan is as noted in the assessment/plan.  For patients with pain not related to these issues, they are to seek any further needed care from a more appropriate provider, such as PCP.        No orders of the defined types were placed in this encounter.    FOLLOW UP: Patient to call for follow up.    Patient was seen and examined with Dr. Moon,  resident, who performed portions of the examination and documentation for this patient's care under my direct supervision.  I agree with the above documentation and plan.    Annamarie Russell MD  01/28/21  12:15 EST

## 2021-01-27 ENCOUNTER — OFFICE VISIT (OUTPATIENT)
Dept: GYNECOLOGIC ONCOLOGY | Facility: CLINIC | Age: 41
End: 2021-01-27

## 2021-01-27 VITALS
RESPIRATION RATE: 16 BRPM | HEIGHT: 63 IN | WEIGHT: 181.6 LBS | SYSTOLIC BLOOD PRESSURE: 142 MMHG | OXYGEN SATURATION: 96 % | BODY MASS INDEX: 32.18 KG/M2 | DIASTOLIC BLOOD PRESSURE: 83 MMHG | TEMPERATURE: 97.8 F | HEART RATE: 87 BPM

## 2021-01-27 DIAGNOSIS — L73.2 HIDRADENITIS SUPPURATIVA: Primary | ICD-10-CM

## 2021-01-27 PROCEDURE — 99213 OFFICE O/P EST LOW 20 MIN: CPT | Performed by: OBSTETRICS & GYNECOLOGY

## 2021-01-27 RX ORDER — TIOTROPIUM BROMIDE AND OLODATEROL 3.124; 2.736 UG/1; UG/1
SPRAY, METERED RESPIRATORY (INHALATION)
COMMUNITY
End: 2021-03-04

## 2021-01-27 RX ORDER — CLINDAMYCIN HYDROCHLORIDE 300 MG/1
300 CAPSULE ORAL 3 TIMES DAILY
Qty: 30 CAPSULE | Refills: 3 | Status: SHIPPED | OUTPATIENT
Start: 2021-01-27 | End: 2021-09-27

## 2021-03-04 DIAGNOSIS — J41.8 MIXED SIMPLE AND MUCOPURULENT CHRONIC BRONCHITIS (HCC): Primary | ICD-10-CM

## 2021-03-04 NOTE — DISCHARGE INSTRUCTIONS
Rest.  Motrin as directed for pain.  Follow up with your PCP if symptoms persist.  Return to ER if worse.   Undermining Type: Entire Wound

## 2021-03-26 ENCOUNTER — OFFICE VISIT (OUTPATIENT)
Dept: FAMILY MEDICINE CLINIC | Facility: CLINIC | Age: 41
End: 2021-03-26

## 2021-03-26 VITALS
WEIGHT: 180 LBS | TEMPERATURE: 97.2 F | BODY MASS INDEX: 31.89 KG/M2 | OXYGEN SATURATION: 99 % | SYSTOLIC BLOOD PRESSURE: 114 MMHG | HEIGHT: 63 IN | HEART RATE: 89 BPM | DIASTOLIC BLOOD PRESSURE: 66 MMHG

## 2021-03-26 DIAGNOSIS — E56.9 VITAMIN DEFICIENCY: ICD-10-CM

## 2021-03-26 DIAGNOSIS — F41.1 GAD (GENERALIZED ANXIETY DISORDER): ICD-10-CM

## 2021-03-26 DIAGNOSIS — Z00.00 GENERAL MEDICAL EXAM: Primary | ICD-10-CM

## 2021-03-26 DIAGNOSIS — Z11.59 ENCOUNTER FOR HEPATITIS C SCREENING TEST FOR LOW RISK PATIENT: ICD-10-CM

## 2021-03-26 DIAGNOSIS — F41.9 ANXIETY: Primary | ICD-10-CM

## 2021-03-26 DIAGNOSIS — K21.9 GASTROESOPHAGEAL REFLUX DISEASE: ICD-10-CM

## 2021-03-26 PROCEDURE — 99395 PREV VISIT EST AGE 18-39: CPT | Performed by: PHYSICIAN ASSISTANT

## 2021-03-26 RX ORDER — DIAZEPAM 5 MG/1
5 TABLET ORAL AS NEEDED
Qty: 30 TABLET | Refills: 5 | Status: SHIPPED | OUTPATIENT
Start: 2021-03-26 | End: 2022-05-31 | Stop reason: SDUPTHER

## 2021-03-26 RX ORDER — LANSOPRAZOLE 30 MG/1
30 CAPSULE, DELAYED RELEASE ORAL DAILY
Qty: 30 CAPSULE | Refills: 11 | Status: SHIPPED | OUTPATIENT
Start: 2021-03-26 | End: 2022-05-31 | Stop reason: SDUPTHER

## 2021-03-26 NOTE — PROGRESS NOTES
Subjective   Jesica Aguilera is a 40 y.o. female  Anxiety (Follow up on anxiety, req refills on diazepam) and Heartburn (follow up on GERD, req refills on lansoprazole )      History of Present Illness  Patient presents today for a preventive medical visit.  Patient is here to determine screening labs and tests that are due and to determine immunization status as well.  Patient will be counseled regarding preventative medicine issues such as regular exercise and  healthy diet as well.  The following portions of the patient's history were reviewed and updated as appropriate: allergies, current medications, past social history and problem list    Review of Systems   Constitutional: Negative.    HENT: Negative.    Eyes: Negative.    Respiratory: Negative.    Cardiovascular: Negative.    Gastrointestinal: Negative.         GERD stable on medication   Endocrine: Negative.    Genitourinary: Negative.    Musculoskeletal: Negative.    Skin: Negative.    Allergic/Immunologic: Negative.    Neurological: Negative.    Hematological: Negative.    Psychiatric/Behavioral: Negative.         Anxiety stable on medication   All other systems reviewed and are negative.      Objective     Vitals:    03/26/21 1400   BP: 114/66   Pulse: 89   Temp: 97.2 °F (36.2 °C)   SpO2: 99%       Physical Exam  Vitals and nursing note reviewed.   Constitutional:       General: She is not in acute distress.     Appearance: Normal appearance. She is well-developed. She is obese. She is not ill-appearing, toxic-appearing or diaphoretic.   HENT:      Head: Normocephalic and atraumatic.      Right Ear: External ear normal.      Left Ear: External ear normal.      Nose: Nose normal.   Eyes:      Conjunctiva/sclera: Conjunctivae normal.      Pupils: Pupils are equal, round, and reactive to light.   Neck:      Thyroid: No thyromegaly.      Vascular: No carotid bruit or JVD.   Cardiovascular:      Rate and Rhythm: Normal rate and regular rhythm.      Heart  sounds: Normal heart sounds. No murmur heard.     Pulmonary:      Effort: Pulmonary effort is normal.      Breath sounds: Normal breath sounds.   Abdominal:      General: Bowel sounds are normal.      Palpations: Abdomen is soft. There is no mass.      Tenderness: There is no abdominal tenderness.   Musculoskeletal:         General: Normal range of motion.      Cervical back: Normal range of motion and neck supple.   Lymphadenopathy:      Cervical: No cervical adenopathy.   Skin:     General: Skin is warm and dry.   Neurological:      Mental Status: She is alert and oriented to person, place, and time.      Cranial Nerves: No cranial nerve deficit.      Coordination: Coordination normal.      Deep Tendon Reflexes: Reflexes are normal and symmetric.   Psychiatric:         Mood and Affect: Mood normal.         Behavior: Behavior normal.         Thought Content: Thought content normal.         Judgment: Judgment normal.       Discussed preventative medicine issues with patient including regular exercise, healthy diet, stress reduction, adequate sleep and recommended age-appropriate screening studies.  Assessment/Plan     Diagnoses and all orders for this visit:    1. General medical exam (Primary)  -     Hepatitis C Antibody; Future  -     Lipid Panel; Future  -     Comprehensive metabolic panel; Future  -     CBC (No Diff); Future  -     TSH; Future    2. Gastroesophageal reflux disease  -     lansoprazole (PREVACID) 30 MG capsule; Take 1 capsule by mouth Daily.  Dispense: 30 capsule; Refill: 11    3. Encounter for hepatitis C screening test for low risk patient  -     Hepatitis C Antibody; Future    4. Vitamin deficiency  -     Vitamin D 25 Hydroxy; Future    5. FABI (generalized anxiety disorder)    Refills will be sent to patient's pharmacy on current dosage of Valium 5 mg 1 daily as needed for anxiety #30 with 5 refills peer discussed potential controlled substance status of this medication.  Discussed need  follow-up in 6 months for refills, will send letter labs when I receive them.

## 2021-04-05 RX ORDER — VARENICLINE TARTRATE 1 MG/1
1 TABLET, FILM COATED ORAL 2 TIMES DAILY
Qty: 60 TABLET | Refills: 0 | Status: SHIPPED | OUTPATIENT
Start: 2021-04-05 | End: 2021-06-16 | Stop reason: SDUPTHER

## 2021-04-05 RX ORDER — TIZANIDINE 2 MG/1
TABLET ORAL
Qty: 30 TABLET | Refills: 6 | Status: SHIPPED | OUTPATIENT
Start: 2021-04-05

## 2021-04-05 NOTE — TELEPHONE ENCOUNTER
Pt called today requesting refill on Rx Chantix needing to be sent to [facility]. Pt has upcoming apt scheduled with Dr Rasheed on 5/6/2021.

## 2021-05-06 ENCOUNTER — LAB (OUTPATIENT)
Dept: LAB | Facility: HOSPITAL | Age: 41
End: 2021-05-06

## 2021-05-06 ENCOUNTER — TELEPHONE (OUTPATIENT)
Dept: PULMONOLOGY | Facility: CLINIC | Age: 41
End: 2021-05-06

## 2021-05-06 ENCOUNTER — OFFICE VISIT (OUTPATIENT)
Dept: PULMONOLOGY | Facility: CLINIC | Age: 41
End: 2021-05-06

## 2021-05-06 VITALS
OXYGEN SATURATION: 98 % | SYSTOLIC BLOOD PRESSURE: 128 MMHG | DIASTOLIC BLOOD PRESSURE: 82 MMHG | BODY MASS INDEX: 32.28 KG/M2 | WEIGHT: 182.2 LBS | HEIGHT: 63 IN | HEART RATE: 76 BPM | TEMPERATURE: 98.6 F

## 2021-05-06 DIAGNOSIS — Z72.0 TOBACCO ABUSE: ICD-10-CM

## 2021-05-06 DIAGNOSIS — Z11.59 ENCOUNTER FOR HEPATITIS C SCREENING TEST FOR LOW RISK PATIENT: ICD-10-CM

## 2021-05-06 DIAGNOSIS — Z00.00 GENERAL MEDICAL EXAM: ICD-10-CM

## 2021-05-06 DIAGNOSIS — L73.9 FOLLICULITIS: ICD-10-CM

## 2021-05-06 DIAGNOSIS — E56.9 VITAMIN DEFICIENCY: ICD-10-CM

## 2021-05-06 DIAGNOSIS — L73.2 HIDRADENITIS SUPPURATIVA: ICD-10-CM

## 2021-05-06 DIAGNOSIS — J44.9 STAGE 2 MODERATE COPD BY GOLD CLASSIFICATION (HCC): Primary | ICD-10-CM

## 2021-05-06 LAB
25(OH)D3 SERPL-MCNC: 22.1 NG/ML (ref 30–100)
ALBUMIN SERPL-MCNC: 4.3 G/DL (ref 3.5–5.2)
ALBUMIN/GLOB SERPL: 1.7 G/DL
ALP SERPL-CCNC: 74 U/L (ref 39–117)
ALT SERPL W P-5'-P-CCNC: 16 U/L (ref 1–33)
ANION GAP SERPL CALCULATED.3IONS-SCNC: 7 MMOL/L (ref 5–15)
AST SERPL-CCNC: 20 U/L (ref 1–32)
BASOPHILS # BLD AUTO: 0.04 10*3/MM3 (ref 0–0.2)
BASOPHILS NFR BLD AUTO: 0.6 % (ref 0–1.5)
BILIRUB SERPL-MCNC: 0.5 MG/DL (ref 0–1.2)
BUN SERPL-MCNC: 10 MG/DL (ref 6–20)
BUN/CREAT SERPL: 10.3 (ref 7–25)
CALCIUM SPEC-SCNC: 9.4 MG/DL (ref 8.6–10.5)
CHLORIDE SERPL-SCNC: 102 MMOL/L (ref 98–107)
CHOLEST SERPL-MCNC: 183 MG/DL (ref 0–200)
CO2 SERPL-SCNC: 27 MMOL/L (ref 22–29)
CREAT SERPL-MCNC: 0.97 MG/DL (ref 0.57–1)
DEPRECATED RDW RBC AUTO: 45.1 FL (ref 37–54)
EOSINOPHIL # BLD AUTO: 0.17 10*3/MM3 (ref 0–0.4)
EOSINOPHIL NFR BLD AUTO: 2.7 % (ref 0.3–6.2)
ERYTHROCYTE [DISTWIDTH] IN BLOOD BY AUTOMATED COUNT: 14.2 % (ref 12.3–15.4)
GFR SERPL CREATININE-BSD FRML MDRD: 64 ML/MIN/1.73
GLOBULIN UR ELPH-MCNC: 2.5 GM/DL
GLUCOSE SERPL-MCNC: 94 MG/DL (ref 65–99)
HCG INTACT+B SERPL-ACNC: <0.5 MIU/ML
HCT VFR BLD AUTO: 44.3 % (ref 34–46.6)
HCV AB SER DONR QL: NORMAL
HDLC SERPL-MCNC: 43 MG/DL (ref 40–60)
HGB BLD-MCNC: 13.8 G/DL (ref 12–15.9)
IMM GRANULOCYTES # BLD AUTO: 0.02 10*3/MM3 (ref 0–0.05)
IMM GRANULOCYTES NFR BLD AUTO: 0.3 % (ref 0–0.5)
LDLC SERPL CALC-MCNC: 121 MG/DL (ref 0–100)
LDLC/HDLC SERPL: 2.76 {RATIO}
LYMPHOCYTES # BLD AUTO: 1.73 10*3/MM3 (ref 0.7–3.1)
LYMPHOCYTES NFR BLD AUTO: 27.5 % (ref 19.6–45.3)
MCH RBC QN AUTO: 27.3 PG (ref 26.6–33)
MCHC RBC AUTO-ENTMCNC: 31.2 G/DL (ref 31.5–35.7)
MCV RBC AUTO: 87.7 FL (ref 79–97)
MONOCYTES # BLD AUTO: 0.36 10*3/MM3 (ref 0.1–0.9)
MONOCYTES NFR BLD AUTO: 5.7 % (ref 5–12)
NEUTROPHILS NFR BLD AUTO: 3.98 10*3/MM3 (ref 1.7–7)
NEUTROPHILS NFR BLD AUTO: 63.2 % (ref 42.7–76)
NRBC BLD AUTO-RTO: 0 /100 WBC (ref 0–0.2)
PLATELET # BLD AUTO: 229 10*3/MM3 (ref 140–450)
PMV BLD AUTO: 11 FL (ref 6–12)
POTASSIUM SERPL-SCNC: 4.5 MMOL/L (ref 3.5–5.2)
PROT SERPL-MCNC: 6.8 G/DL (ref 6–8.5)
RBC # BLD AUTO: 5.05 10*6/MM3 (ref 3.77–5.28)
SODIUM SERPL-SCNC: 136 MMOL/L (ref 136–145)
TRIGL SERPL-MCNC: 106 MG/DL (ref 0–150)
TSH SERPL DL<=0.05 MIU/L-ACNC: 1.86 UIU/ML (ref 0.27–4.2)
VLDLC SERPL-MCNC: 19 MG/DL (ref 5–40)
WBC # BLD AUTO: 6.3 10*3/MM3 (ref 3.4–10.8)

## 2021-05-06 PROCEDURE — 85025 COMPLETE CBC W/AUTO DIFF WBC: CPT

## 2021-05-06 PROCEDURE — 36415 COLL VENOUS BLD VENIPUNCTURE: CPT

## 2021-05-06 PROCEDURE — 84443 ASSAY THYROID STIM HORMONE: CPT

## 2021-05-06 PROCEDURE — 99214 OFFICE O/P EST MOD 30 MIN: CPT | Performed by: INTERNAL MEDICINE

## 2021-05-06 PROCEDURE — 80053 COMPREHEN METABOLIC PANEL: CPT

## 2021-05-06 PROCEDURE — 82306 VITAMIN D 25 HYDROXY: CPT

## 2021-05-06 PROCEDURE — 80061 LIPID PANEL: CPT

## 2021-05-06 PROCEDURE — 86803 HEPATITIS C AB TEST: CPT

## 2021-05-06 PROCEDURE — 84702 CHORIONIC GONADOTROPIN TEST: CPT

## 2021-05-06 RX ORDER — TIOTROPIUM BROMIDE AND OLODATEROL 3.124; 2.736 UG/1; UG/1
2 SPRAY, METERED RESPIRATORY (INHALATION) DAILY
Qty: 1 EACH | Refills: 6 | Status: SHIPPED | OUTPATIENT
Start: 2021-05-06 | End: 2021-05-07

## 2021-05-06 NOTE — PROGRESS NOTES
"Pulmonary Office Follow Up      Subjective   Chief Complaint: Shortness of Breath    Jesica Aguilera is a 40 y.o. female is being seen in follow up for COPD    History of Present Illness    Ms. Aguilera is a 39yo F with a history of tobacco abuse who was initially seen on 1/10/18. At that time, she had PFTs performed which showed moderate airway obstruction. She was started on Stiolto. Her insurance required her to try Anoro which did not work for her and she then resumed the Stiolto.     She was last seen in clinic on 9/15/20.     Since her last visit, she quit smoking on 10/15/20 using Chantix. She continues on maintenance dose Chantix. She has not had any exacerbations since her last visit as long as she uses Stiolto.       The following portions of the patient's history were reviewed and updated as appropriate: allergies, current medications, past family history, past medical history, past social history, past surgical history and problem list.    Review of Systems   Constitutional: Negative.    HENT: Negative.    Eyes: Negative.    Respiratory: Negative.    Cardiovascular: Negative.    Gastrointestinal: Negative.    Endocrine: Negative.    Genitourinary: Negative.    Musculoskeletal: Negative.    Skin: Negative.    Allergic/Immunologic: Negative.    Neurological: Negative.    Hematological: Negative.    Psychiatric/Behavioral: Negative.          Objective   Blood pressure 128/82, pulse 76, temperature 98.6 °F (37 °C), height 160 cm (63\"), weight 82.6 kg (182 lb 3.2 oz), SpO2 98 %, not currently breastfeeding.  Physical Exam   Constitutional: She is oriented to person, place, and time. She appears well-developed. No distress.   HENT:   Head: Normocephalic and atraumatic.   Eyes: Pupils are equal, round, and reactive to light. Conjunctivae are normal. No scleral icterus.   Neck: No tracheal deviation present. No thyromegaly present.   Cardiovascular: Normal rate, regular rhythm and normal heart sounds. "   Pulmonary/Chest: Effort normal and breath sounds normal. No respiratory distress.   Abdominal: Soft. Bowel sounds are normal. There is no abdominal tenderness.   Musculoskeletal: Normal range of motion.   Lymphadenopathy:     She has no cervical adenopathy.   Neurological: She is alert and oriented to person, place, and time. She exhibits normal muscle tone. Coordination normal.   Skin: Skin is warm and dry. No rash noted. No erythema.   Psychiatric: Her speech is normal and behavior is normal. Judgment normal.   Nursing note and vitals reviewed.      PFTs:  No new PFTs.     Imaging:  No new imaging.      Assessment/Plan   Diagnoses and all orders for this visit:    1. Stage 2 moderate COPD by GOLD classification (CMS/[facility]) (Primary)    2. Tobacco abuse    Other orders  -     tiotropium bromide-olodaterol (Stiolto Respimat) 2.5-2.5 MCG/ACT aerosol solution inhaler; Inhale 2 puffs Daily.  Dispense: 1 each; Refill: 6        Discussion:  Ms. Aguilera is a 41yo F who returns to clinic for follow up.     1. Moderate COPD  - No exacerbations since last visit.   - Continue Stiolto. Rx sent. She has tried and failed Anoro in the past with recurrent exacerbations. She has been exacerbation free since starting Stiolto.   - continue PRN Albuterol  - Alpha-1-antitrypsin phenotype is normal.   - She has chosen not to receive the COVID vaccine.     2. Tobacco Abuse  - Quit smoking on 10/15/20 with Chantix  - Continue maintenance dose Chantix.      Follow up in 6 months.     I have spent 34 minutes reviewing the patient record, face to face with the patient discussing current status and further management and in documentation and coordination of care.     Monica OSWALD Case, DO  Pulmonary and Critical Care Medicine  Note electronically signed

## 2021-05-07 DIAGNOSIS — J44.9 STAGE 2 MODERATE COPD BY GOLD CLASSIFICATION (HCC): Primary | ICD-10-CM

## 2021-05-07 RX ORDER — GLYCOPYRROLATE AND FORMOTEROL FUMARATE 9; 4.8 UG/1; UG/1
2 AEROSOL, METERED RESPIRATORY (INHALATION) ONCE
Qty: 2 EACH | Refills: 0 | Status: SHIPPED | OUTPATIENT
Start: 2021-05-07 | End: 2021-05-07

## 2021-05-11 DIAGNOSIS — J44.9 STAGE 2 MODERATE COPD BY GOLD CLASSIFICATION (HCC): Primary | ICD-10-CM

## 2021-05-12 RX ORDER — GLYCOPYRROLATE AND FORMOTEROL FUMARATE 9; 4.8 UG/1; UG/1
2 AEROSOL, METERED RESPIRATORY (INHALATION) 2 TIMES DAILY
Qty: 1 EACH | Refills: 6 | Status: SHIPPED | OUTPATIENT
Start: 2021-05-12

## 2021-06-16 DIAGNOSIS — Z72.0 TOBACCO ABUSE: Primary | ICD-10-CM

## 2021-06-16 RX ORDER — VARENICLINE TARTRATE 1 MG/1
1 TABLET, FILM COATED ORAL 2 TIMES DAILY
Qty: 60 TABLET | Refills: 0 | Status: SHIPPED | OUTPATIENT
Start: 2021-06-16 | End: 2021-09-27

## 2021-08-04 ENCOUNTER — OFFICE VISIT (OUTPATIENT)
Dept: GYNECOLOGIC ONCOLOGY | Facility: CLINIC | Age: 41
End: 2021-08-04

## 2021-08-04 VITALS
BODY MASS INDEX: 32.34 KG/M2 | TEMPERATURE: 100 F | HEART RATE: 100 BPM | OXYGEN SATURATION: 99 % | WEIGHT: 182.5 LBS | DIASTOLIC BLOOD PRESSURE: 74 MMHG | SYSTOLIC BLOOD PRESSURE: 126 MMHG | HEIGHT: 63 IN | RESPIRATION RATE: 19 BRPM

## 2021-08-04 DIAGNOSIS — L73.2 HIDRADENITIS SUPPURATIVA: Primary | ICD-10-CM

## 2021-08-04 PROCEDURE — 99212 OFFICE O/P EST SF 10 MIN: CPT | Performed by: OBSTETRICS & GYNECOLOGY

## 2021-08-04 PROCEDURE — 87070 CULTURE OTHR SPECIMN AEROBIC: CPT | Performed by: OBSTETRICS & GYNECOLOGY

## 2021-08-04 PROCEDURE — 87205 SMEAR GRAM STAIN: CPT | Performed by: OBSTETRICS & GYNECOLOGY

## 2021-08-04 RX ORDER — TRAMADOL HYDROCHLORIDE 50 MG/1
50 TABLET ORAL EVERY 6 HOURS PRN
Qty: 5 TABLET | Refills: 0 | Status: SHIPPED | OUTPATIENT
Start: 2021-08-04 | End: 2021-08-24

## 2021-08-04 RX ORDER — CEPHALEXIN 500 MG/1
500 CAPSULE ORAL 2 TIMES DAILY
Qty: 14 CAPSULE | Refills: 0 | Status: SHIPPED | OUTPATIENT
Start: 2021-08-04 | End: 2021-08-23 | Stop reason: SDUPTHER

## 2021-08-04 NOTE — PROGRESS NOTES
Jesica Aguilera  [id number]  1980      Reason for visit:  hidradenitis suppurativa    History of present illness:  The patient is a 40 y.o. female who presents today for treatment and evaluation of the above issues.    Patient presents today for recurrent symptoms of hidradenitis suppurativa and complains of associated severe pain.  Says that the left groin.  She reports that she used a tweezer to try to remove here and that she actually grabbed tissue instead of hair.  Associated drainage, erythema, and pain ensued shortly thereafter.  She is taking Tylenol and ibuprofen for pain.  She has financial concerns.  She notes that her symptoms have resolved in areas of prior surgical resection.    OBGYN History:  She is a G 1 P 1001.  She does not use HRT. She does not have a history of abnormal pap smears.      Oncologic History:  Oncology/Hematology History    No history exists.         Past Medical History:   Diagnosis Date   • Acid reflux    • Anxiety    • Arthritis    • Asthma    • Bipolar 1 disorder (CMS/[facility])    • COPD (chronic obstructive pulmonary disease) (CMS/[facility])    • Depression    • Dyspareunia, female    • Fatigue    • Hx of gastroesophageal reflux (GERD) early 2007   • Migraines    • Pelvic pain    • Seasonal allergies        Past Surgical History:   Procedure Laterality Date   • HEMORRHOIDECTOMY      anal fissure, sphincter   • VULVA SURGERY         MEDICATIONS: The current medication list was reviewed with the patient and updated in the EMR this date per the Medical Assistant. Medication dosages and frequencies were confirmed to be accurate.      Allergies:  is allergic to norco [hydrocodone-acetaminophen].    Social History:   Social History     Socioeconomic History   • Marital status:      Spouse name: Not on file   • Number of children: 1   • Years of education: Not on file   • Highest education level: Not on file   Tobacco Use   • Smoking status: Former Smoker     Packs/day: 1.00      "Years: 24.00     Pack years: 24.00     Types: Cigarettes     Start date:      Quit date: 10/15/2020     Years since quittin.8   • Smokeless tobacco: Never Used   Substance and Sexual Activity   • Alcohol use: No     Comment: rare   • Drug use: No   • Sexual activity: Yes     Partners: Male     Birth control/protection: I.U.D.       Family History:    Family History   Problem Relation Age of Onset   • Hypertension Mother    • Obesity Mother    • Hypertension Father    • Arthritis Father    • Polymyalgia rheumatica Father    • Obesity Sister    • No Known Problems Brother    • No Known Problems Maternal Grandmother    • Hyperlipidemia Maternal Grandfather    • Hypertension Maternal Grandfather    • No Known Problems Paternal Grandmother    • No Known Problems Paternal Grandfather    • Melanoma Paternal Aunt    • Breast cancer Maternal Aunt    • Ovarian cancer Neg Hx    • Colon cancer Neg Hx        Health Maintenance:    Health Maintenance   Topic Date Due   • COVID-19 Vaccine (1) Never done   • PAP SMEAR  2021   • Pneumococcal Vaccine 0-64 (1 of 2 - PPSV23) 2022 (Originally 1988)   • TDAP/TD VACCINES (1 - Tdap) 2022 (Originally 2001)   • INFLUENZA VACCINE  10/01/2021   • ANNUAL PHYSICAL  2022   • HEPATITIS C SCREENING  Completed     Review of Systems   Genitourinary: Positive for genital sores.   Allergic/Immunologic: Positive for environmental allergies.   Psychiatric/Behavioral: The patient is nervous/anxious.        Physical Exam    Vitals:    21 1348   BP: 126/74   Pulse: 100   Resp: 19   Temp: 100 °F (37.8 °C)   TempSrc: Temporal   SpO2: 99%   Weight: 82.8 kg (182 lb 8 oz)   Height: 160 cm (62.99\")   PainSc:   9       Body mass index is 32.34 kg/m².    Wt Readings from Last 3 Encounters:   21 82.8 kg (182 lb 8 oz)   21 82.6 kg (182 lb 3.2 oz)   21 81.6 kg (180 lb)         GENERAL: Alert, well-appearing female appearing her stated age who is in no " apparent distress.  She appears uncomfortable.  HEENT: Sclera anicteric. Head normocephalic, atraumatic. Mucus membranes moist.   NECK: Deferred.   BREASTS: Deferred  CARDIOVASCULAR: Deferred  RESPIRATORY:  normal respiratory effort  BACK: Deferred  GASTROINTESTINAL: Deferred  SKIN:  Warm, dry, well-perfused.  Please refer to pelvic exam.  PSYCHIATRIC: AO x3, with appropriate affect, normal thought processes.  Anxious and uncomfortable  NEUROLOGIC: No focal deficits.  Moves extremities well.  MUSCULOSKELETAL: Normal gait and station.   EXTREMITIES:   No cyanosis, clubbing, symmetric.  LYMPHATICS:  No inguinal adenopathy noted.     PELVIC exam:  She has well-healed scars throughout the labia majora and left posterior vulva.   1.5 cm lesion at left medial thigh fold with associated erythema, fullness, and minimal drainage.  ECOG PS 0  PROCEDURES: None    Diagnostic Data:    Lab Results   Component Value Date    WBC 6.30 05/06/2021    HGB 13.8 05/06/2021    HCT 44.3 05/06/2021    MCV 87.7 05/06/2021     05/06/2021    NEUTROABS 3.98 05/06/2021    GLUCOSE 94 05/06/2021    BUN 10 05/06/2021    CREATININE 0.97 05/06/2021    EGFRIFNONA 64 05/06/2021     05/06/2021    K 4.5 05/06/2021     05/06/2021    CO2 27.0 05/06/2021    CALCIUM 9.4 05/06/2021    ALBUMIN 4.30 05/06/2021    AST 20 05/06/2021    ALT 16 05/06/2021    BILITOT 0.5 05/06/2021     No results found for:         Assessment/Plan   This is a 40 y.o. woman with hidradenitis suppurativa.  Encounter Diagnosis   Name Primary?   • Hidradenitis suppurativa Yes      Hidradenitis Suppurativa  Keflex was prescribed.  Ultram was prescribed for pain.  Patient is to call if she has ongoing symptoms that require evaluation.  Otherwise, she can follow-up on an as-needed basis.  This is partially due to her financial concerns.  She did have questions about repeat resection of lesions.  I advised her that the current area of concern could be resected in the  office.  She verbalized understanding.    She has not undergone Covid vaccination and is not interested in pursuing this.  I told her that I was concerned for her health and wellbeing and strongly encouraged her to undergo vaccination.    Pain assessment was performed today as a part of patient’s care.  For patients with pain related to surgery, gynecologic malignancy or cancer treatment, the plan is as noted in the assessment/plan.  For patients with pain not related to these issues, they are to seek any further needed care from a more appropriate provider, such as PCP.        Orders Placed This Encounter   Procedures   • Wound Culture - Wound, Groin     Standing Status:   Future     Number of Occurrences:   1     Standing Expiration Date:   8/4/2022     Order Specific Question:   Release to patient     Answer:   Immediate     FOLLOW UP: Patient to call for follow up.    Annamarie Russell MD  08/04/21  12:15 EST

## 2021-08-06 ENCOUNTER — TELEPHONE (OUTPATIENT)
Dept: GYNECOLOGIC ONCOLOGY | Facility: CLINIC | Age: 41
End: 2021-08-06

## 2021-08-06 NOTE — TELEPHONE ENCOUNTER
Called pt and told her culture was negative and the plan would be to continue the antibiotic plan. She is to call back Monday if it has worsened over the weekend. But the pt reports that spot is less angry than before.

## 2021-08-06 NOTE — TELEPHONE ENCOUNTER
----- Message from Annamarie Russell MD sent at 8/6/2021  8:40 AM EDT -----  Please notify patient that wound culture was negative.  Continue current antibiotic plan.  Follow-up as needed as previously discussed.    ----- Message -----  From: Lab, Background User  Sent: 8/4/2021   7:51 PM EDT  To: Annamarie Russell MD

## 2021-08-07 LAB
BACTERIA SPEC AEROBE CULT: NORMAL
GRAM STN SPEC: NORMAL

## 2021-08-23 ENCOUNTER — PATIENT MESSAGE (OUTPATIENT)
Dept: GYNECOLOGIC ONCOLOGY | Facility: CLINIC | Age: 41
End: 2021-08-23

## 2021-08-23 RX ORDER — CEPHALEXIN 500 MG/1
500 CAPSULE ORAL 2 TIMES DAILY
Qty: 14 CAPSULE | Refills: 0 | Status: SHIPPED | OUTPATIENT
Start: 2021-08-23 | End: 2021-09-27

## 2021-08-24 ENCOUNTER — OFFICE VISIT (OUTPATIENT)
Dept: OBSTETRICS AND GYNECOLOGY | Facility: CLINIC | Age: 41
End: 2021-08-24

## 2021-08-24 VITALS
DIASTOLIC BLOOD PRESSURE: 64 MMHG | WEIGHT: 181.2 LBS | HEIGHT: 63 IN | SYSTOLIC BLOOD PRESSURE: 112 MMHG | BODY MASS INDEX: 32.11 KG/M2

## 2021-08-24 DIAGNOSIS — Z01.419 WELL WOMAN EXAM WITH ROUTINE GYNECOLOGICAL EXAM: Primary | ICD-10-CM

## 2021-08-24 DIAGNOSIS — Z30.09 STERILIZATION EDUCATION: ICD-10-CM

## 2021-08-24 PROCEDURE — 99395 PREV VISIT EST AGE 18-39: CPT | Performed by: OBSTETRICS & GYNECOLOGY

## 2021-08-24 NOTE — PROGRESS NOTES
Subjective   Chief Complaint   Patient presents with   • Gynecologic Exam     annual     Jesica Aguilera is a 40 y.o. year old  presenting to be seen for her annual exam.     SEXUAL Hx:  She is not currently sexually active.  In the past year there she has not been sexually active.    Condoms are never used.  She would not like to be screened for STD's at today's exam.  Current birth control method: IUD - Mirena.  She is happy with her current method of contraception and does want to discuss alternative methods of contraception.  MENSTRUAL Hx:  No LMP recorded. Patient has had an implant.  In the past 6 months her cycles have been absent.  Her menstrual flow has been absent.   Each month on average there are roughly 0 day(s) of very heavy flow.    Intermenstrual bleeding is absent.    Post-coital bleeding is absent.  Dysmenorrhea: none  PMS: none  Her cycles are not a source of concern for her that she wishes to discuss today.  HEALTH Hx:  She exercises regularly: yes.  She wears her seat belt: yes.  She has concerns about domestic violence: no.  OTHER THINGS SHE WANTS TO DISCUSS TODAY:  She has questions about sterilization today.    The following portions of the patient's history were reviewed and updated as appropriate:problem list, current medications, allergies, past family history, past medical history, past social history and past surgical history.    Social History    Tobacco Use      Smoking status: Former Smoker        Packs/day: 1.00        Years: 24.00        Pack years: 24        Types: Cigarettes        Start date:         Quit date: 10/15/2020        Years since quittin.8      Smokeless tobacco: Never Used    Review of Systems  Constitutional POS: nothing reported    NEG: anorexia or night sweats   Genitourinary POS: MICHELLE is present but it IS NOT effecting her ADL's    NEG: dysuria or hematuria      Gastointestinal POS: nothing reported    NEG: bloating, change in bowel habits, melena  "or reflux symptoms   Integument POS: nothing reported    NEG: moles that are changing in size, shape, color or rashes   Breast POS: nothing reported    NEG: persistent breast lump, skin dimpling or nipple discharge        Objective   /64   Ht 160 cm (63\")   Wt 82.2 kg (181 lb 3.2 oz)   Breastfeeding No   BMI 32.10 kg/m²     General:  well developed; well nourished  no acute distress   Skin:  No suspicious lesions seen   Thyroid: normal to inspection and palpation   Breasts:  Examined in supine position  Symmetric without masses or skin dimpling  Nipples normal without inversion, lesions or discharge  There are no palpable axillary nodes   Abdomen: soft, non-tender; no masses  no umbilical or inguinal hernias are present  no hepato-splenomegaly   Pelvis: Clinical staff was present for exam  External genitalia:  normal appearance of the external genitalia including Bartholin's and Laird's glands.  :  urethral meatus normal;  Vaginal:  normal pink mucosa without prolapse or lesions.  Cervix:  normal appearance. IUD string present - 3 cms in length;  Uterus:  normal size, shape and consistency.  Adnexa:  normal bimanual exam of the adnexa.  Rectal:  digital rectal exam not performed; anus visually normal appearing.        Assessment   1. Normal GYN exam  2. Mirena IUD - Strings visualized today   3. Sterilization education      Plan   Pap and HPV were done today.  If she does not receive the results of the Pap within 2 weeks  time, she was instructed to call to find out the results.  I explained to Jesica that the recommendations for Pap smear interval in a low risk patient's has lengthened to 5 years time if both cytology and HPV testing were normal.  I encouraged her to be seen yearly for a full physical exam including breast and pelvic exam even during the off years when PAP's will not be performed.  Mammograms to start at age 40 years old  Reviewed the importance of exercise 2-3 times per week with at " least 20-30 minutes of cardio  Reviewed permanent sterilization in detail and that my recommendation would be laparoscopic bilateral salpingectomy. She reports she is in a talk with her mother and then call back and let me know. At this point she desires to probably keep her Mirena IUD in place given she does not have periods with it  No prescription was given or electronically sent at today's visit  The importance of keeping all planned follow-up and taking all medications as prescribed was emphasized.  Follow up for annual exam in one year    No orders of the defined types were placed in this encounter.    Total time spent today with Jesica  was 10-19 minutes (level 2).  Off this time, > 50% was spent face-to-face time coordinating care, answering her questions and counseling regarding contraception and sterilization.         This note was electronically signed.    Dai Menjivar MD  August 24, 2021    Note: Speech recognition transcription software may have been used to create portions of this document.  An attempt at proofreading has been made but errors in transcription could still be present.

## 2021-08-26 ENCOUNTER — TELEPHONE (OUTPATIENT)
Dept: OBSTETRICS AND GYNECOLOGY | Facility: CLINIC | Age: 41
End: 2021-08-26

## 2021-08-26 DIAGNOSIS — Z30.2 ENCOUNTER FOR STERILIZATION: Primary | ICD-10-CM

## 2021-08-26 NOTE — TELEPHONE ENCOUNTER
PT talked to Dr. Menjivar about getting her tubes tied.  PT does want to do this so she was hoping to get this scheduled. Please call her back to do this.

## 2021-08-27 NOTE — TELEPHONE ENCOUNTER
Orders Placed This Encounter   Procedures   • External Facility Surgical/Procedural Request     Standing Status:   Future     Standing Expiration Date:   8/27/2022     Order Specific Question:   Requested Location     Answer:   [facility]     Order Specific Question:   Procedure/ Order for Consent     Answer:   Laparoscopic bilateral salpingectomy     Order Specific Question:   Laterality     Answer:   Bilateral     Order Specific Question:   Anesthesia type     Answer:   General [80]     Order Specific Question:   Pre-op diagnosis     Answer:   Desires permanent sterilization     Order placed to get laparoscopic bilateral salpingectomy scheduled.  However, I do need to see her in the office to rediscuss to make sure she understands the procedure before doing the surgery.    Thanks, Dai Menjivar MD

## 2021-09-14 ENCOUNTER — TELEPHONE (OUTPATIENT)
Dept: OBSTETRICS AND GYNECOLOGY | Facility: CLINIC | Age: 41
End: 2021-09-14

## 2021-09-14 NOTE — TELEPHONE ENCOUNTER
Call to Sd to discuss a surgery date - you indicated you wanted to see her again but she voices to me that she understands completely what this procedure is all about and she recalls exactly what you told her.    She is choosing not to come to the office/hospital anymore than need be due to COVID and the need for a copay.    I mentioned 9/27 as a date for surgery but she does want to know the finances in regards to the procedure before committing to a date.

## 2021-09-14 NOTE — TELEPHONE ENCOUNTER
If she has no other further questions about the procedure and understands that both fallopian tubes will be removed then I am okay with moving forward for scheduling.    Thanks, Dai Menjivar MD

## 2021-09-16 RX ORDER — CLINDAMYCIN PHOSPHATE 10 MG/G
1 GEL TOPICAL 2 TIMES DAILY
Qty: 30 G | Refills: 3 | Status: SHIPPED | OUTPATIENT
Start: 2021-09-16

## 2021-09-27 ENCOUNTER — OFFICE VISIT (OUTPATIENT)
Dept: GYNECOLOGIC ONCOLOGY | Facility: CLINIC | Age: 41
End: 2021-09-27

## 2021-09-27 VITALS
SYSTOLIC BLOOD PRESSURE: 139 MMHG | HEIGHT: 63 IN | TEMPERATURE: 98.6 F | BODY MASS INDEX: 31.71 KG/M2 | DIASTOLIC BLOOD PRESSURE: 88 MMHG | WEIGHT: 179 LBS | RESPIRATION RATE: 18 BRPM | OXYGEN SATURATION: 99 % | HEART RATE: 88 BPM

## 2021-09-27 DIAGNOSIS — L73.9 FOLLICULITIS: ICD-10-CM

## 2021-09-27 DIAGNOSIS — L73.2 HIDRADENITIS SUPPURATIVA: Primary | ICD-10-CM

## 2021-09-27 PROCEDURE — 99213 OFFICE O/P EST LOW 20 MIN: CPT | Performed by: OBSTETRICS & GYNECOLOGY

## 2021-09-27 RX ORDER — SULFAMETHOXAZOLE AND TRIMETHOPRIM 800; 160 MG/1; MG/1
1 TABLET ORAL 2 TIMES DAILY
Qty: 20 TABLET | Refills: 0 | Status: SHIPPED | OUTPATIENT
Start: 2021-09-27 | End: 2021-10-15

## 2021-09-27 NOTE — PROGRESS NOTES
Jesica Aguilera  [id number]  1980      Reason for visit:  hidradenitis suppurativa    History of present illness:  The patient is a 40 y.o. female who presents today for treatment and evaluation of the above issues.    Patient presents today for recurrent symptoms of hidradenitis suppurativa in the left groin area. States that this is the same area for which she was evaluated in August and initially treated with keflex which resolved the issue. However patient reports that lesion recurred around 2 weeks ago. States that she called in and was started on  Clindamycin oral as well as topical gel. Has noted draining of the area as well as a new flap of tissue. States drainage is clear and area has become significantly tender to touch in the last 2 days. Also reports that area appeared to become more firm. Feels severe discomfort with the area and would like surgical management, however is concerned about cost. Denies any fevers/chills, chest pain, SOA or changes in bowel/bladder symptoms.     OBGYN History:  She is a G 1 P 1001.  She does not use HRT. She does not have a history of abnormal pap smears.      Oncologic History:  Oncology/Hematology History    No history exists.         Past Medical History:   Diagnosis Date   • Acid reflux    • Anxiety    • Arthritis    • Asthma    • Bipolar 1 disorder (CMS/[facility])    • COPD (chronic obstructive pulmonary disease) (CMS/[facility])    • Depression    • Dyspareunia, female    • Fatigue    • Hx of gastroesophageal reflux (GERD) early 2007   • Migraines    • Pelvic pain    • Seasonal allergies        Past Surgical History:   Procedure Laterality Date   • HEMORRHOIDECTOMY  2011    anal fissure, sphincter   • VULVA SURGERY  11/2020       MEDICATIONS: The current medication list was reviewed with the patient and updated in the EMR this date per the Medical Assistant. Medication dosages and frequencies were confirmed to be accurate.      Allergies:  is allergic to norco  [hydrocodone-acetaminophen].    Social History:   Social History     Socioeconomic History   • Marital status:      Spouse name: Not on file   • Number of children: 1   • Years of education: Not on file   • Highest education level: Not on file   Tobacco Use   • Smoking status: Former Smoker     Packs/day: 1.00     Years: 24.00     Pack years: 24.00     Types: Cigarettes     Start date:      Quit date: 10/15/2020     Years since quittin.9   • Smokeless tobacco: Never Used   Vaping Use   • Vaping Use: Former   • Substances: Nicotine   • Devices: Disposable, Pre-filled or refillable cartridge   Substance and Sexual Activity   • Alcohol use: No     Comment: rare   • Drug use: No   • Sexual activity: Yes     Partners: Male     Birth control/protection: I.U.D.       Family History:    Family History   Problem Relation Age of Onset   • Hypertension Mother    • Obesity Mother    • Hypertension Father    • Arthritis Father    • Polymyalgia rheumatica Father    • Obesity Sister    • No Known Problems Brother    • No Known Problems Maternal Grandmother    • Hyperlipidemia Maternal Grandfather    • Hypertension Maternal Grandfather    • No Known Problems Paternal Grandmother    • No Known Problems Paternal Grandfather    • Melanoma Paternal Aunt    • Breast cancer Maternal Aunt    • Ovarian cancer Neg Hx    • Colon cancer Neg Hx        Health Maintenance:    Health Maintenance   Topic Date Due   • COVID-19 Vaccine (1) Never done   • Pneumococcal Vaccine 0-64 (1 of 2 - PPSV23) 2022 (Originally 1988)   • TDAP/TD VACCINES (1 - Tdap) 2022 (Originally 2001)   • INFLUENZA VACCINE  10/01/2021   • ANNUAL PHYSICAL  2022   • PAP SMEAR  2024   • HEPATITIS C SCREENING  Completed     Review of Systems   Constitutional: Negative for diaphoresis and fever.   Respiratory: Negative for shortness of breath.    Cardiovascular: Negative for chest pain.   Genitourinary: Positive for genital sores.  "  Skin: Positive for wound.   Allergic/Immunologic: Negative for environmental allergies.   Psychiatric/Behavioral: The patient is nervous/anxious.        Physical Exam    Vitals:    09/27/21 1409   BP: 139/88  Comment: LUE   Pulse: 88   Resp: 18   Temp: 98.6 °F (37 °C)   TempSrc: Infrared   SpO2: 99%  Comment: RA   Weight: 81.2 kg (179 lb)   Height: 160 cm (63\")   PainSc:   7   PainLoc: Groin       Body mass index is 31.71 kg/m².    Wt Readings from Last 3 Encounters:   09/27/21 81.2 kg (179 lb)   08/24/21 82.2 kg (181 lb 3.2 oz)   08/04/21 82.8 kg (182 lb 8 oz)         GENERAL: Alert, well-appearing female appearing her stated age who is in moderate distress and appears uncomfortable.  HEENT: Sclera anicteric. Head normocephalic, atraumatic. Mucus membranes moist.   NECK: Deferred.   BREASTS: Deferred  CARDIOVASCULAR: deferred   RESPIRATORY:  normal respiratory effort  BACK: Deferred  GASTROINTESTINAL: Deferred  SKIN:  Warm, dry, well-perfused.  Please refer to pelvic exam.  PSYCHIATRIC: AO x3, with appropriate affect, normal thought processes.  Anxious and uncomfortable  NEUROLOGIC: No focal deficits.  Moves extremities well.  MUSCULOSKELETAL: Normal gait and station.   EXTREMITIES:   No cyanosis, clubbing, symmetric.  LYMPHATICS:  No inguinal adenopathy noted.     PELVIC exam:  She has well-healed scars throughout the labia majora and left posterior vulva.   1.5 cm lesion at left lateral mons with associated erythema, and tenderness, small flap of tissue present.  Fullness was appreciated, but fluctuance could not be appreciated due to patient's discomfort.  ECOG PS 0      PROCEDURES: None    Diagnostic Data:    Lab Results   Component Value Date    WBC 6.30 05/06/2021    HGB 13.8 05/06/2021    HCT 44.3 05/06/2021    MCV 87.7 05/06/2021     05/06/2021    NEUTROABS 3.98 05/06/2021    GLUCOSE 94 05/06/2021    BUN 10 05/06/2021    CREATININE 0.97 05/06/2021    EGFRIFNONA 64 05/06/2021     05/06/2021    " K 4.5 05/06/2021     05/06/2021    CO2 27.0 05/06/2021    CALCIUM 9.4 05/06/2021    ALBUMIN 4.30 05/06/2021    AST 20 05/06/2021    ALT 16 05/06/2021    BILITOT 0.5 05/06/2021     No results found for:         Assessment/Plan   This is a 40 y.o. woman with hidradenitis suppurativa.  Encounter Diagnoses   Name Primary?   • Hidradenitis suppurativa Yes   • Folliculitis       Hidradenitis Suppurativa  - has tried keflex, clindamycin oral and gel without improvement  - will prescribe bactrim  - patient would like procedure to remove area however is concerned about financial expenditure associated with outpatient surgery center/OR  - discussed that we can proceed with in office simple partial vulvectomy with plan for local anesthesia and valium prior to appointment  Patient was consented for simple partial vulvectomy.      Risks and benefits of surgery were discussed.  This included, but was not limited to, infection and bleeding like when the skin is cut; damage to surrounding structures; and incisional complications.  Risk of DVT was addressed for major surgeries.  Standard of care efforts to minimize these risks were reviewed.  Typical recovery was discussed as well as post-procedure precautions.  Surgical implications of chronic illnesses on recovery and surgical outcome were reviewed.     Pain medication regimen for postoperative care was discussed.  Typical regimen and avoidance of narcotics was discussed.  Patient was educated that other factors, such as existing narcotic use, can impact postoperative pain management.      Patient verbalized understanding of the plan including the risks and benefits.  Appropriate perioperative testing including laboratory evaluation, EKG as clinically indicated, chest x-ray as clinically indicated, and preadmission evaluation were all ordered as a part of this patient's care.    Office simple partial vulvectomy is scheduled for 10/8/2021  Will need prescription for  oxycodone 5 mg 1 p.o. every 4 hours as needed for pain and patient is to take 1 prior to the procedure  She has a home supply of Valium and is to take 1 prior to the procedure  She will need to take 1000 mg of Tylenol p.o. and 600 mg of ibuprofen p.o. prior to the procedure  She will need to come and have EMLA cream applied prior to procedure  She is to have a  for the procedure  She is not vaccinated and she was advised that she needs a negative PCR Covid test prior to her procedure.  I advised she get tested 2 to 3 days before the procedure.  We discussed some options for where she can get this done.  Patient will not need antibiotic prescription since she is already on Bactrim twice daily.     Pain assessment was performed today as a part of patient’s care.  For patients with pain related to surgery, gynecologic malignancy or cancer treatment, the plan is as noted in the assessment/plan.  For patients with pain not related to these issues, they are to seek any further needed care from a more appropriate provider, such as PCP.        No orders of the defined types were placed in this encounter.    FOLLOW UP: At time of procedure     Patient was seen and examined with Dr. Morse,  resident, who performed portions of the examination and documentation for this patient's care under my direct supervision.  I agree with the above documentation and plan.    Annamarie Russell MD  09/27/21  16:14 EDT

## 2021-09-30 ENCOUNTER — TELEPHONE (OUTPATIENT)
Dept: GYNECOLOGIC ONCOLOGY | Facility: CLINIC | Age: 41
End: 2021-09-30

## 2021-10-06 RX ORDER — OXYCODONE HYDROCHLORIDE 5 MG/1
5 TABLET ORAL EVERY 4 HOURS PRN
Qty: 15 TABLET | Refills: 0 | Status: SHIPPED | OUTPATIENT
Start: 2021-10-06 | End: 2021-10-15

## 2021-10-08 ENCOUNTER — PROCEDURE VISIT (OUTPATIENT)
Dept: GYNECOLOGIC ONCOLOGY | Facility: CLINIC | Age: 41
End: 2021-10-08

## 2021-10-08 VITALS
HEART RATE: 98 BPM | HEIGHT: 63 IN | RESPIRATION RATE: 18 BRPM | DIASTOLIC BLOOD PRESSURE: 79 MMHG | TEMPERATURE: 99.6 F | WEIGHT: 179 LBS | SYSTOLIC BLOOD PRESSURE: 141 MMHG | BODY MASS INDEX: 31.71 KG/M2

## 2021-10-08 DIAGNOSIS — L73.2 HIDRADENITIS SUPPURATIVA: Primary | ICD-10-CM

## 2021-10-08 PROCEDURE — 11423 EXC H-F-NK-SP B9+MARG 2.1-3: CPT | Performed by: OBSTETRICS & GYNECOLOGY

## 2021-10-08 PROCEDURE — 88305 TISSUE EXAM BY PATHOLOGIST: CPT | Performed by: OBSTETRICS & GYNECOLOGY

## 2021-10-08 NOTE — PROGRESS NOTES
Procedure: Partial simple vulvectomy  Preoperative diagnosis: Recurrent hidradenitis suppurativa, symptomatic  Postoperative diagnosis: Same  Specimen: Vulvar lesion  Findings: See below    Procedure:  After obtaining informed consent and patient/site identification, patient was prepped and draped in the usual sterile fashion.  Area of concern was identified at the left vulva.  This was a 1 cm pedunculated lesion with an underlying 2.5 cm firm portion.  No purulent matter was expressed during the procedure.    Area was injected with half percent Marcaine with epinephrine x10 mL.  Scalpel was used to perform an elliptical incision.  Dissection was carried to completely remove the area of concern.  Specimen was handed off the field for permanent pathology.  Bovie electrocautery was used for hemostasis.  2-0 Vicryl suture was used in interrupted fashion to reapproximate the deep dermis.  3-0 Monocryl was used to reapproximate the skin in a subcuticular stitch.  Glue was placed at the incision.  All counts were correct.  Procedure was well-tolerated.  There are no immediate complications.    Patient was discharged in good condition.  Wound care was discussed.  Prescriptions had already been sent.  Patient is to follow-up in 1 week's time for repeat evaluation.    Electronically signed by Annamarie Russell MD, 10/11/21, 10:01 AM EDT.

## 2021-10-11 LAB
CYTO UR: NORMAL
LAB AP CASE REPORT: NORMAL
PATH REPORT.FINAL DX SPEC: NORMAL
PATH REPORT.GROSS SPEC: NORMAL

## 2021-10-15 ENCOUNTER — OFFICE VISIT (OUTPATIENT)
Dept: GYNECOLOGIC ONCOLOGY | Facility: CLINIC | Age: 41
End: 2021-10-15

## 2021-10-15 VITALS
WEIGHT: 179 LBS | BODY MASS INDEX: 31.71 KG/M2 | DIASTOLIC BLOOD PRESSURE: 78 MMHG | HEART RATE: 84 BPM | RESPIRATION RATE: 16 BRPM | HEIGHT: 63 IN | OXYGEN SATURATION: 98 % | SYSTOLIC BLOOD PRESSURE: 116 MMHG

## 2021-10-15 DIAGNOSIS — Z98.890 POST-OPERATIVE STATE: Primary | ICD-10-CM

## 2021-10-15 PROCEDURE — 99024 POSTOP FOLLOW-UP VISIT: CPT | Performed by: OBSTETRICS & GYNECOLOGY

## 2021-10-15 NOTE — PROGRESS NOTES
"Jesica Aguilera  [id number]  1980      Reason for Visit:  Postoperative evaluation    History of Present Illness:  Patient is a very pleasant 41 y.o. woman who presents for a post operative evaluation status post in-office simple partial vulvectomy performed on 10/8/2021.      Procedure was uncomplicated.  Today, patient notes normal bowel and bladder function.  Her pain was doing well by Wednesday but was a little bit more irritated yesterday and is worse today.  She is wondering if the incision is getting infected as this feels like her hidradenitis suppurativa symptoms.  Past Medical History, Past Surgical History, Social History, Family History have been reviewed and are without significant changes except as mentioned.    Review of Systems   All other systems were reviewed and are negative except as mentioned above.    Medications:  The current medication list was reviewed in the EMR    ALLERGIES:    Allergies   Allergen Reactions   • Norco [Hydrocodone-Acetaminophen] Itching           /78   Pulse 84   Resp 16   Ht 160 cm (62.99\")   Wt 81.2 kg (179 lb)   SpO2 98%   BMI 31.72 kg/m²   ECOG score: 0         Physical Exam  Constitutional:  Patient is a pleasant woman in no acute distress.  Extremities:  Bilateral lower extremities are non-tender.  Gynecologic: Incision on left vulva is clean dry and intact.  Central area of erythema without involvement of surrounding tissue is more consistent with ongoing healing than infection.    PATHOLOGY:  Final Diagnosis  VULVA, LESION, EXCISION:               Polypoid fragment of ulcerated granulation tissue with underlying marked acute and chronic dermal inflammation with foreign body type giant cell suggestive of hidradenitis and prior procedure               Negative for malignancy    ASSESSMENT/PLAN:  Jesica Aguilera with a history of hidradenitis suppurativa who returns for a post-operative evaluation today following recent simple partial vulvectomy. "   Interdry given in wound care discussed.    She is to follow-up in 1 week for repeat evaluation.  Electronically signed by Annamarie Russell MD, 10/15/21, 4:24 PM EDT.

## 2021-10-18 ENCOUNTER — PATIENT MESSAGE (OUTPATIENT)
Dept: GYNECOLOGIC ONCOLOGY | Facility: CLINIC | Age: 41
End: 2021-10-18

## 2021-10-18 RX ORDER — FLUCONAZOLE 150 MG/1
150 TABLET ORAL ONCE
Qty: 1 TABLET | Refills: 0 | Status: SHIPPED | OUTPATIENT
Start: 2021-10-18 | End: 2021-10-18

## 2021-10-18 NOTE — TELEPHONE ENCOUNTER
From: Jesica Aguilera  To: Annamarie Russell MD  Sent: 10/18/2021 10:28 AM EDT  Subject: Prescription for an oral yeast infection medicine    I briefly recall a conversation about having an oral yeast infection medication called into the pharmacy shop on [address] for me after my procedure on Friday, October 8. I went to the pharmacy shop on the 15th after my postop appointment and they did not have anything. I thought I sent a message at that time but I don’t think the message went through because it’s not showing…    Can you please call/send in the prescription for a oral yeast infection medicine?

## 2021-10-21 NOTE — PROGRESS NOTES
"Jesica Aguilera  [id number]  1980      Reason for Visit:  Postoperative evaluation    History of Present Illness:  Patient is a very pleasant 41 y.o. woman who presents for a post operative evaluation status post in-office simple partial vulvectomy performed on 10/8/2021.      Procedure was uncomplicated.  Today, patient continues to have some soreness in the area of excision.  She notes that it is more sore than she thought it would be.  She notes them occasional yellow-tinged drainage but no purulent drainage.    Past Medical History, Past Surgical History, Social History, Family History have been reviewed and are without significant changes except as mentioned.    Review of Systems   All other systems were reviewed and are negative except as mentioned above.    Medications:  The current medication list was reviewed in the EMR    ALLERGIES:    Allergies   Allergen Reactions   • Norco [Hydrocodone-Acetaminophen] Itching           /75 Comment: RUE  Pulse 88   Temp 98 °F (36.7 °C) (Infrared)   Resp 18   Ht 160 cm (63\")   Wt 83.5 kg (184 lb)   SpO2 98% Comment: RA  BMI 32.59 kg/m²   ECOG score: 1         Physical Exam  Constitutional:  Patient is a pleasant woman in no acute distress.  Extremities:  Bilateral lower extremities are non-tender.  Gynecologic: Incision on left vulva has some firmness under it, mild erythema, and a very focal separation.  No drainage could be expressed.  There is no fluctuance.    PATHOLOGY:  Final Diagnosis  VULVA, LESION, EXCISION:               Polypoid fragment of ulcerated granulation tissue with underlying marked acute and chronic dermal inflammation with foreign body type giant cell suggestive of hidradenitis and prior procedure               Negative for malignancy    ASSESSMENT/PLAN:  Jesica Aguilera presented today for postoperative evaluation status post recent simple partial vulvectomy.   Diflucan was prescribed at last visit to cover any potential yeast " infection.  She has not used this yet.  Bactrim was prescribed x1 week to cover any potential infection at the incision.  She is going to call for any repeat postoperative evaluation needed.  Electronically signed by Annamarie Russell MD, 10/22/21, 3:52 PM EDT.

## 2021-10-22 ENCOUNTER — OFFICE VISIT (OUTPATIENT)
Dept: GYNECOLOGIC ONCOLOGY | Facility: CLINIC | Age: 41
End: 2021-10-22

## 2021-10-22 VITALS
TEMPERATURE: 98 F | WEIGHT: 184 LBS | RESPIRATION RATE: 18 BRPM | DIASTOLIC BLOOD PRESSURE: 75 MMHG | OXYGEN SATURATION: 98 % | SYSTOLIC BLOOD PRESSURE: 140 MMHG | HEIGHT: 63 IN | BODY MASS INDEX: 32.6 KG/M2 | HEART RATE: 88 BPM

## 2021-10-22 DIAGNOSIS — Z98.890 POST-OPERATIVE STATE: Primary | ICD-10-CM

## 2021-10-22 PROCEDURE — 99024 POSTOP FOLLOW-UP VISIT: CPT | Performed by: OBSTETRICS & GYNECOLOGY

## 2021-10-22 RX ORDER — FLUCONAZOLE 150 MG/1
TABLET ORAL
COMMUNITY
Start: 2021-10-18 | End: 2022-05-31

## 2021-10-22 RX ORDER — SULFAMETHOXAZOLE AND TRIMETHOPRIM 800; 160 MG/1; MG/1
1 TABLET ORAL 2 TIMES DAILY
Qty: 14 TABLET | Refills: 0 | Status: SHIPPED | OUTPATIENT
Start: 2021-10-22 | End: 2021-11-09

## 2021-11-08 ENCOUNTER — OFFICE VISIT (OUTPATIENT)
Dept: GYNECOLOGIC ONCOLOGY | Facility: CLINIC | Age: 41
End: 2021-11-08

## 2021-11-08 VITALS
HEIGHT: 63 IN | BODY MASS INDEX: 32.6 KG/M2 | SYSTOLIC BLOOD PRESSURE: 117 MMHG | OXYGEN SATURATION: 99 % | DIASTOLIC BLOOD PRESSURE: 75 MMHG | TEMPERATURE: 98.4 F | WEIGHT: 184 LBS | RESPIRATION RATE: 16 BRPM | HEART RATE: 92 BPM

## 2021-11-08 DIAGNOSIS — Z98.890 POST-OPERATIVE STATE: Primary | ICD-10-CM

## 2021-11-08 PROCEDURE — 99024 POSTOP FOLLOW-UP VISIT: CPT | Performed by: OBSTETRICS & GYNECOLOGY

## 2021-11-08 NOTE — PROGRESS NOTES
"Jesica Aguilera  [id number]  1980      Reason for Visit:  Postoperative evaluation    History of Present Illness:  Patient is a very pleasant 41 y.o. woman who presents for a post operative evaluation status post in-office simple partial vulvectomy performed on 10/8/2021.      Today, patient continues to have some soreness in the area of excision. She has been on a course of Bactrim but did not pick her prescription up for about a week.  She is concerned that she has developed infection due to noticing a new malodor. Denies any recent fevers.    Past Medical History, Past Surgical History, Social History, Family History have been reviewed and are without significant changes except as mentioned.    Review of Systems   All other systems were reviewed and are negative except as mentioned above.    Medications:  The current medication list was reviewed in the EMR    ALLERGIES:    Allergies   Allergen Reactions   • Norco [Hydrocodone-Acetaminophen] Itching           /75 Comment: LUE  Pulse 92   Temp 98.4 °F (36.9 °C) (Infrared)   Resp 16   Ht 160 cm (63\")   Wt 83.5 kg (184 lb)   SpO2 99% Comment: RA  BMI 32.59 kg/m²   ECOG score: 1         Physical Exam  Constitutional:  Patient is a pleasant woman in no acute distress.  Extremities:  Bilateral lower extremities are non-tender.  Gynecologic: Incision on left vulva has some firmness under it, mild erythema, small vicryl knot was removed.  No drainage could be expressed.  There is no fluctuance.  Incision itself is red without any surrounding erythema suggestive of infection.    PATHOLOGY:  Final Diagnosis  VULVA, LESION, EXCISION:               Polypoid fragment of ulcerated granulation tissue with underlying marked acute and chronic dermal inflammation with foreign body type giant cell suggestive of hidradenitis and prior procedure               Negative for malignancy    ASSESSMENT/PLAN:  Jesica Aguilera presented today for another postoperative " evaluation status post recent simple partial vulvectomy.  She completed a course of Bactrim with no improvement in her incision.  I do not think this is likely an infection and actually put her on antibiotics more because I was leaving town at her last visit and concern for possible evolving infection. Discussed continued observation. She will call clinic in 1 week to give update.  Otherwise follow-up as needed.  Encouraged topical clindamycin.    Patient was seen and examined with Dr. Alan,  resident, who performed portions of the examination and documentation for this patient's care under my direct supervision.  I agree with the above documentation and plan.    Annamarie Russell MD  11/09/21  09:27 EST

## 2022-05-31 ENCOUNTER — LAB (OUTPATIENT)
Dept: LAB | Facility: HOSPITAL | Age: 42
End: 2022-05-31

## 2022-05-31 ENCOUNTER — TRANSCRIBE ORDERS (OUTPATIENT)
Dept: FAMILY MEDICINE CLINIC | Facility: CLINIC | Age: 42
End: 2022-05-31

## 2022-05-31 ENCOUNTER — OFFICE VISIT (OUTPATIENT)
Dept: FAMILY MEDICINE CLINIC | Facility: CLINIC | Age: 42
End: 2022-05-31

## 2022-05-31 VITALS
WEIGHT: 187 LBS | TEMPERATURE: 97.2 F | HEART RATE: 92 BPM | SYSTOLIC BLOOD PRESSURE: 122 MMHG | DIASTOLIC BLOOD PRESSURE: 76 MMHG | HEIGHT: 63 IN | OXYGEN SATURATION: 99 % | BODY MASS INDEX: 33.13 KG/M2

## 2022-05-31 DIAGNOSIS — G56.03 BILATERAL CARPAL TUNNEL SYNDROME: ICD-10-CM

## 2022-05-31 DIAGNOSIS — F41.9 ANXIETY: ICD-10-CM

## 2022-05-31 DIAGNOSIS — M25.532 ACUTE PAIN OF BOTH WRISTS: ICD-10-CM

## 2022-05-31 DIAGNOSIS — Z00.00 GENERAL MEDICAL EXAM: Primary | ICD-10-CM

## 2022-05-31 DIAGNOSIS — M79.641 PAIN IN BOTH HANDS: Primary | ICD-10-CM

## 2022-05-31 DIAGNOSIS — M25.50 ARTHRALGIA, UNSPECIFIED JOINT: ICD-10-CM

## 2022-05-31 DIAGNOSIS — Z00.00 GENERAL MEDICAL EXAM: ICD-10-CM

## 2022-05-31 DIAGNOSIS — G47.30 SLEEP APNEA, UNSPECIFIED TYPE: ICD-10-CM

## 2022-05-31 DIAGNOSIS — E66.9 CLASS 1 OBESITY WITH BODY MASS INDEX (BMI) OF 33.0 TO 33.9 IN ADULT, UNSPECIFIED OBESITY TYPE, UNSPECIFIED WHETHER SERIOUS COMORBIDITY PRESENT: ICD-10-CM

## 2022-05-31 DIAGNOSIS — R53.82 CHRONIC FATIGUE: ICD-10-CM

## 2022-05-31 DIAGNOSIS — K21.9 GASTROESOPHAGEAL REFLUX DISEASE: ICD-10-CM

## 2022-05-31 DIAGNOSIS — M79.642 PAIN IN BOTH HANDS: Primary | ICD-10-CM

## 2022-05-31 DIAGNOSIS — M25.531 ACUTE PAIN OF BOTH WRISTS: ICD-10-CM

## 2022-05-31 LAB
DEPRECATED RDW RBC AUTO: 44.4 FL (ref 37–54)
ERYTHROCYTE [DISTWIDTH] IN BLOOD BY AUTOMATED COUNT: 14.2 % (ref 12.3–15.4)
ERYTHROCYTE [SEDIMENTATION RATE] IN BLOOD: 10 MM/HR (ref 0–20)
HCT VFR BLD AUTO: 43.7 % (ref 34–46.6)
HGB BLD-MCNC: 14 G/DL (ref 12–15.9)
MCH RBC QN AUTO: 27.6 PG (ref 26.6–33)
MCHC RBC AUTO-ENTMCNC: 32 G/DL (ref 31.5–35.7)
MCV RBC AUTO: 86 FL (ref 79–97)
PLATELET # BLD AUTO: 254 10*3/MM3 (ref 140–450)
PMV BLD AUTO: 11.1 FL (ref 6–12)
RBC # BLD AUTO: 5.08 10*6/MM3 (ref 3.77–5.28)
WBC NRBC COR # BLD: 10.2 10*3/MM3 (ref 3.4–10.8)

## 2022-05-31 PROCEDURE — 80050 GENERAL HEALTH PANEL: CPT

## 2022-05-31 PROCEDURE — 36415 COLL VENOUS BLD VENIPUNCTURE: CPT

## 2022-05-31 PROCEDURE — 86140 C-REACTIVE PROTEIN: CPT

## 2022-05-31 PROCEDURE — 80061 LIPID PANEL: CPT

## 2022-05-31 PROCEDURE — 85652 RBC SED RATE AUTOMATED: CPT

## 2022-05-31 PROCEDURE — 99395 PREV VISIT EST AGE 18-39: CPT | Performed by: PHYSICIAN ASSISTANT

## 2022-05-31 RX ORDER — DIAZEPAM 5 MG/1
5 TABLET ORAL DAILY PRN
Qty: 30 TABLET | Refills: 5 | Status: SHIPPED | OUTPATIENT
Start: 2022-05-31

## 2022-05-31 RX ORDER — LANSOPRAZOLE 30 MG/1
30 CAPSULE, DELAYED RELEASE ORAL DAILY
Qty: 30 CAPSULE | Refills: 11 | Status: SHIPPED | OUTPATIENT
Start: 2022-05-31

## 2022-05-31 NOTE — TELEPHONE ENCOUNTER
----- Message from Jesica Adams sent at 5/31/2022  3:33 PM EDT -----  Regarding: Beny adams  Good afternoon:   I am at the pharmacy to get my prescription that were sent over. It appears that the diazepam prescription did not go through. I am trying to get all my prescription at one time to save me another trip to [place]. Would it be possible for you all to send that over to the pharmacy shop on [address]

## 2022-05-31 NOTE — TELEPHONE ENCOUNTER
This has to be approved by MD, this was already sent to him for review, notified patient she will need to wait until MD signs off on this medication

## 2022-05-31 NOTE — PROGRESS NOTES
"Sammi Aguilera is a 41 y.o. female  Annual Exam (Annual Physical Exam and labs, referral for EMG), Anxiety (Refill on Diazepam for anxiety), and Heartburn (Refill on Prevacid for GERD)      History of Present Illness  Patient presents today for a preventive medical visit.  Patient is here to determine screening labs and tests that are due and to determine immunization status as well.  Patient will be counseled regarding preventative medicine issues such as regular exercise and healthy diet as well.    The patient is a 41-year-old female seen today for an annual physical as well as follow-up on anxiety and GERD.    The patient reports that she is doing well overall. She states that she has arthritis in her lower back and her discs. The patient reports that before she went on vacation and drove 8 hours, she needed some relief, so although she does not typically not go to chiropractors, she went to one and got an adjustment. The patient reports that she has lost the natural curve in her neck from sitting at the computer and she feels like she is hurting in her ankles, knees, and lower back. The patient reports that she has been doing CBD, but not all the time. She states that she would rather do natural. The patient reports that her ankles have been bothering her more lately since she started getting adjusted, as do her feet and knees. She states her body is \"out of line.\"     The patient reports that she knows that she has severe carpal tunnel, which is why she needs a referral to an EMG. She states that she was unhappy with her care at [facility]. She reports that she had an appointment there but she was kept waiting over an hour and then was given injections in her hands without being told what they were and she did not feel she was able to ask questions. The patient reports that the injections gave her 6 days of relief. She states that she has numbness and tingling in all of her fingers " as well as pain in her wrists in both hands. 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 The patient reports that this has been going on for years but she has not mentioned it because she did not want to deal with it before now. She states that she can not wear a brace at night because her hand swells. She states that she did have an EMG scheduled but had so much trouble finding out the financial end of things she canceled and decided to go through [facility], [place].     The patient reports that her asthma is good. She states that her chest has been tight, but she thinks it is just the heat. The patient reports that she has COPD and asthma. She states that she sees pulmonary as needed. She says she needs to start her inhalers daily instead of as needed.     She states that she is up to date on her Pap smears. She reports that she quit coffee for the month of May and just started back. She reports that if she does not drink coffee, her bowels are not as regular.     She complains of extreme fatigue, reporting that she can sleep 17 hours out of the day. She states that she has borderline sleep apnea. She states that her mother's sister has lupus and her father has fibromyalgia.    The following portions of the patient's history were reviewed and updated as appropriate: allergies, current medications, past social history and problem list    Review of Systems   Constitutional: Positive for fatigue.   HENT: Negative.    Eyes: Negative.    Respiratory: Positive for apnea.    Cardiovascular: Negative.    Gastrointestinal: Negative.    Endocrine: Negative.    Genitourinary: Negative.    Musculoskeletal: Positive for arthralgias, back pain and myalgias.   Skin: Negative.    Allergic/Immunologic: Negative.    Neurological: Negative.    Hematological: Negative.    Psychiatric/Behavioral: The patient is nervous/anxious.    All other systems reviewed and are negative.      Objective     Vitals:    05/31/22  1412   BP: 122/76   Pulse: 92   Temp: 97.2 °F (36.2 °C)   SpO2: 99%       Physical Exam  Vitals and nursing note reviewed.   Constitutional:       General: She is not in acute distress.     Appearance: Normal appearance. She is well-developed. She is obese. She is not ill-appearing, toxic-appearing or diaphoretic.      Comments: +BMI 33   HENT:      Head: Normocephalic and atraumatic.      Right Ear: External ear normal.      Left Ear: External ear normal.   Eyes:      Conjunctiva/sclera: Conjunctivae normal.      Pupils: Pupils are equal, round, and reactive to light.   Neck:      Thyroid: No thyromegaly.      Vascular: No carotid bruit or JVD.   Cardiovascular:      Rate and Rhythm: Normal rate and regular rhythm.      Pulses: Normal pulses.      Heart sounds: Normal heart sounds. No murmur heard.  Pulmonary:      Effort: Pulmonary effort is normal. No respiratory distress.      Breath sounds: Normal breath sounds.   Abdominal:      General: Bowel sounds are normal.      Palpations: Abdomen is soft. There is no mass.      Tenderness: There is no abdominal tenderness.   Musculoskeletal:         General: No swelling. Normal range of motion.      Cervical back: Normal range of motion and neck supple.   Lymphadenopathy:      Cervical: No cervical adenopathy.   Skin:     General: Skin is warm and dry.      Findings: No lesion or rash.   Neurological:      Mental Status: She is alert and oriented to person, place, and time.      Cranial Nerves: No cranial nerve deficit.      Sensory: No sensory deficit.      Motor: No weakness.      Coordination: Coordination normal.      Gait: Gait normal.      Deep Tendon Reflexes: Reflexes are normal and symmetric.   Psychiatric:         Mood and Affect: Mood normal.         Behavior: Behavior normal.         Thought Content: Thought content normal.         Judgment: Judgment normal.       Discussed preventative medicine issues with patient including regular exercise, healthy diet,  stress reduction, adequate sleep and recommended age-appropriate screening studies.  Assessment & Plan     Diagnoses and all orders for this visit:    1. General medical exam (Primary)  -     Comprehensive metabolic panel; Future  -     CBC (No Diff); Future  -     Sedimentation Rate; Future  -     C-reactive protein; Future  -     TSH; Future  -     Lipid Panel; Future    2. Anxiety    3. Gastroesophageal reflux disease  -     lansoprazole (PREVACID) 30 MG capsule; Take 1 capsule by mouth Daily.  Dispense: 30 capsule; Refill: 11    4. Arthralgia, unspecified joint  -     Sedimentation Rate; Future  -     C-reactive protein; Future    5. Bilateral carpal tunnel syndrome    6. Chronic fatigue    7. Class 1 obesity with body mass index (BMI) of 33.0 to 33.9 in adult, unspecified obesity type, unspecified whether serious comorbidity present    8. Sleep apnea, unspecified type       The patient's anxiety is fairly stable and she can continue her current medication regimen. I will send in a refill for her Prevacid. I will send in a referral for an EMG and the neurology department will give her a call. They will do the EMG and once I get that, we will go from there as far as whether to refer her to  versus [facility]. We will get fasting blood work today.    Transcribed from ambient dictation for Maria Isabel Espinal PA-C by BENNETT LARA.  05/31/22   17:05 EDT    Patient verbalized consent to the visit recording.

## 2022-05-31 NOTE — TELEPHONE ENCOUNTER
Caller: WillyJesica    Relationship: Self    Best call back number: [phone]    Requested Prescriptions:   Requested Prescriptions     Pending Prescriptions Disp Refills   • diazePAM (VALIUM) 5 MG tablet 30 tablet 5     Sig: Take 1 tablet by mouth As Needed for Anxiety.        Pharm[facility] where request [facility] be sent[facility] [address] [phone] [facility] [phone] FX     Additional details provided by patient:   PT STATED THAT RX WAS SUPPOSE TO BE SENT TO PHARMACY TODAY BUT THEY HAVE NOT RECEIVED RX.    Does the patient have less than a 3 day supply:  [x] Yes  [] No    Amber Araiza Rep   05/31/22 15:26 EDT

## 2022-05-31 NOTE — TELEPHONE ENCOUNTER
Pharmacy called stating that have not received the prescription for diazepam 5 MG. Patient is currently at the pharmacy now. Please advise.

## 2022-06-01 LAB
ALBUMIN SERPL-MCNC: 4.9 G/DL (ref 3.5–5.2)
ALBUMIN/GLOB SERPL: 2.1 G/DL
ALP SERPL-CCNC: 70 U/L (ref 39–117)
ALT SERPL W P-5'-P-CCNC: 23 U/L (ref 1–33)
ANION GAP SERPL CALCULATED.3IONS-SCNC: 12.5 MMOL/L (ref 5–15)
AST SERPL-CCNC: 24 U/L (ref 1–32)
BILIRUB SERPL-MCNC: 0.4 MG/DL (ref 0–1.2)
BUN SERPL-MCNC: 7 MG/DL (ref 6–20)
BUN/CREAT SERPL: 8 (ref 7–25)
CALCIUM SPEC-SCNC: 9.3 MG/DL (ref 8.6–10.5)
CHLORIDE SERPL-SCNC: 100 MMOL/L (ref 98–107)
CHOLEST SERPL-MCNC: 193 MG/DL (ref 0–200)
CO2 SERPL-SCNC: 23.5 MMOL/L (ref 22–29)
CREAT SERPL-MCNC: 0.88 MG/DL (ref 0.57–1)
CRP SERPL-MCNC: <0.3 MG/DL (ref 0–0.5)
EGFRCR SERPLBLD CKD-EPI 2021: 85.9 ML/MIN/1.73
GLOBULIN UR ELPH-MCNC: 2.3 GM/DL
GLUCOSE SERPL-MCNC: 86 MG/DL (ref 65–99)
HDLC SERPL-MCNC: 46 MG/DL (ref 40–60)
LDLC SERPL CALC-MCNC: 126 MG/DL (ref 0–100)
LDLC/HDLC SERPL: 2.69 {RATIO}
POTASSIUM SERPL-SCNC: 4.2 MMOL/L (ref 3.5–5.2)
PROT SERPL-MCNC: 7.2 G/DL (ref 6–8.5)
SODIUM SERPL-SCNC: 136 MMOL/L (ref 136–145)
TRIGL SERPL-MCNC: 116 MG/DL (ref 0–150)
TSH SERPL DL<=0.05 MIU/L-ACNC: 1.87 UIU/ML (ref 0.27–4.2)
VLDLC SERPL-MCNC: 21 MG/DL (ref 5–40)

## 2022-06-13 ENCOUNTER — TELEPHONE (OUTPATIENT)
Dept: OBSTETRICS AND GYNECOLOGY | Facility: CLINIC | Age: 42
End: 2022-06-13

## 2022-06-13 NOTE — TELEPHONE ENCOUNTER
I called and spoke to patient and let her know that Brittany recommend taking  800mg of ibuprofen 30-45 mins before appt. Pt verbally understood.

## 2022-06-13 NOTE — TELEPHONE ENCOUNTER
ONEAL      PT HAS IUD INSERTION ON 06/20/22. SHE'S ASKING IF SHE NEEDS TO TAKE ANY PAIN MEDS PRIOR TO APPT TO HELP WITH ANY DISCOMFORT.

## 2022-06-14 NOTE — PROGRESS NOTES
IUD Removal and Immediate Reinsertion    No LMP recorded. Patient has had an implant.    Date of procedure:  06/20/2022  Risks and benefits discussed? yes  All questions answered? yes  Consents given by the patient  Written consent obtained? yes  Reason for removal: Device expiration    Local anesthesia used:  no    Procedure documentation:    A speculum was placed in order to view the cervix.  A tenaculum did need to be placed on the anterior cervical lip.  Cervical dilation did not need to be performed in order to access the string.  The IUD string was easily seen.  The string was grasped and the IUD was removed without difficulty.  The IUD did not appear to be adherent to the uterine cavity. It was removed intact.    A sterile speculum was replaced and the cervix was cleansed with an antiseptic solution.  Vaginal discharge was scant.  The anterior lip of the cervix was grasped with a tenaculum and the uterine cavity was gently sounded.  There was no difficulty passing the sound through the cervix.  Cervical dilation did not need to be performed prior to placing the IUD.  The uterus was anteverted and sounded to 8 cms.  The Mirena was then prepared per the manufacturers instructions.    The Mirena was advanced to a point 2 cms from the fundus and then the arms were released from the sheath.  The device was advanced to the fundus and the device was released fully from the sheath.. The string was cut 3 cms in length.  Bleeding from the cervix was scant.    She tolerated the procedure without any difficulty.     Post procedure instructions:   It was reviewed that the Mirena will not alter the timing of when she bleeds but it may decrease the quantity of flow and cramps.  Roughly 30% of people will be amenorrheic over time.  Efficacy rate of 99.2% over 7 years was discussed.  Spontaneous expulsion rate of 1-2% was also discussed.  If she has any issue with fever or excessive bleeding or pain she is to call the office  immediately.  Otherwise I would like to see her back in 6 weeks for string check.   This note was electronically signed.  JAXON Watts  06/20/2022

## 2022-06-20 ENCOUNTER — OFFICE VISIT (OUTPATIENT)
Dept: OBSTETRICS AND GYNECOLOGY | Facility: CLINIC | Age: 42
End: 2022-06-20

## 2022-06-20 VITALS
DIASTOLIC BLOOD PRESSURE: 80 MMHG | BODY MASS INDEX: 33.49 KG/M2 | SYSTOLIC BLOOD PRESSURE: 124 MMHG | WEIGHT: 189 LBS | HEIGHT: 63 IN

## 2022-06-20 DIAGNOSIS — Z30.430 ENCOUNTER FOR IUD INSERTION: Primary | ICD-10-CM

## 2022-06-20 PROCEDURE — 58300 INSERT INTRAUTERINE DEVICE: CPT | Performed by: NURSE PRACTITIONER

## 2022-06-20 PROCEDURE — 58301 REMOVE INTRAUTERINE DEVICE: CPT | Performed by: NURSE PRACTITIONER

## 2022-07-27 ENCOUNTER — APPOINTMENT (OUTPATIENT)
Dept: NEUROLOGY | Facility: HOSPITAL | Age: 42
End: 2022-07-27

## 2022-08-01 ENCOUNTER — OFFICE VISIT (OUTPATIENT)
Dept: OBSTETRICS AND GYNECOLOGY | Facility: CLINIC | Age: 42
End: 2022-08-01

## 2022-08-01 VITALS
WEIGHT: 187.6 LBS | BODY MASS INDEX: 33.24 KG/M2 | DIASTOLIC BLOOD PRESSURE: 80 MMHG | HEIGHT: 63 IN | SYSTOLIC BLOOD PRESSURE: 120 MMHG

## 2022-08-01 DIAGNOSIS — Z30.431 IUD CHECK UP: Primary | ICD-10-CM

## 2022-08-01 PROCEDURE — 99212 OFFICE O/P EST SF 10 MIN: CPT | Performed by: NURSE PRACTITIONER

## 2022-08-01 NOTE — PROGRESS NOTES
"Subjective   Chief Complaint   Patient presents with   • Contraception     IUD check     Jesica Aguilera is a 41 y.o. year old .  No LMP recorded (lmp unknown). (Menstrual status: Other).  She presents to be seen for iud string check. She had mirena iud removed and new device inserted 22. She reports intermittent cramping and bleeding since placement, denies cramping today.     The following portions of the patient's history were reviewed and updated as appropriate:current medications and allergies    Social History    Tobacco Use      Smoking status: Former Smoker        Packs/day: 3.00        Years: 27.00        Pack years: 81        Types: Cigarettes, Cigarettes        Start date: 1993        Quit date: 10/15/2020        Years since quittin.7      Smokeless tobacco: Never Used         Objective   /80 (BP Location: Right arm, Patient Position: Sitting, Cuff Size: Adult)   Ht 160 cm (63\")   Wt 85.1 kg (187 lb 9.6 oz)   LMP  (LMP Unknown) Comment: Mirena  BMI 33.23 kg/m²   Pelvic exam: normal external genitalia, vulva, vagina, cervix, iud strings noted in correct position extending 3 cms from cervical os    Lab Review   No data reviewed    Imaging   No data reviewed        Assessment   1. iud follow up     Plan   1. iud strings in correct placement  2. The importance of keeping all planned follow-up and taking all medications as prescribed was emphasized.  3. Return to care for annual or prn    No orders of the defined types were placed in this encounter.         This note was electronically signed.    JAXON Watts  2022      "

## 2022-09-26 ENCOUNTER — OFFICE VISIT (OUTPATIENT)
Dept: OBSTETRICS AND GYNECOLOGY | Facility: CLINIC | Age: 42
End: 2022-09-26

## 2022-09-26 VITALS
DIASTOLIC BLOOD PRESSURE: 60 MMHG | HEIGHT: 63 IN | BODY MASS INDEX: 31.5 KG/M2 | SYSTOLIC BLOOD PRESSURE: 110 MMHG | WEIGHT: 177.8 LBS

## 2022-09-26 DIAGNOSIS — Z01.419 WELL WOMAN EXAM WITH ROUTINE GYNECOLOGICAL EXAM: ICD-10-CM

## 2022-09-26 DIAGNOSIS — Z12.31 SCREENING MAMMOGRAM FOR BREAST CANCER: Primary | ICD-10-CM

## 2022-09-26 PROCEDURE — 99395 PREV VISIT EST AGE 18-39: CPT | Performed by: NURSE PRACTITIONER

## 2022-09-26 NOTE — PROGRESS NOTES
Subjective   Chief Complaint   Patient presents with   • Annual Exam     Well woman exam     Jesica Aguilera is a 41 y.o. year old  presenting to be seen for her annual exam. She is doing well with her Mirena, she is amenorrheic.  Has occasional cramping.     SEXUAL Hx:  She is currently sexually active.  In the past year there there has been NO new sexual partners.    Condoms are not needed because she is not sexually active.  She would not like to be screened for STD's at today's exam.  Current birth control method: IUD - Mirena.  She is happy with her current method of contraception and does not want to discuss alternative methods of contraception.  MENSTRUAL Hx:  No LMP recorded (lmp unknown). (Menstrual status: Other).  In the past 6 months her cycles have been absent.  Her menstrual flow has been absent.   Each month on average there are roughly 0 day(s) of very heavy flow.    Intermenstrual bleeding is absent.    Post-coital bleeding is n/a.  Dysmenorrhea: mild and is not affecting her activities of daily living  PMS: is not affecting her activities of daily living  Her cycles are not a source of concern for her that she wishes to discuss today.  HEALTH Hx:  She exercises regularly: yes.  She wears her seat belt: yes.  She has concerns about domestic violence: no.  OTHER THINGS SHE WANTS TO DISCUSS TODAY:  Nothing else    The following portions of the patient's history were reviewed and updated as appropriate:problem list, current medications, allergies, past family history, past medical history, past social history and past surgical history.    Social History    Tobacco Use      Smoking status: Former Smoker        Packs/day: 3.00        Years: 27.00        Pack years: 81        Types: Cigarettes        Start date: 1993        Quit date: 10/15/2020        Years since quittin.9      Smokeless tobacco: Never Used    Review of Systems  Constitutional POS: nothing reported    NEG: anorexia or  "night sweats   Genitourinary POS: nothing reported    NEG: dysuria or hematuria      Gastointestinal POS: nothing reported    NEG: bloating, change in bowel habits, melena or reflux symptoms   Integument POS: nothing reported    NEG: moles that are changing in size, shape, color or rashes   Breast POS: nothing reported    NEG: persistent breast lump, skin dimpling or nipple discharge        Objective   /60 (BP Location: Left arm, Patient Position: Sitting, Cuff Size: Adult)   Ht 160 cm (63\")   Wt 80.6 kg (177 lb 12.8 oz)   LMP  (LMP Unknown) Comment: Mirena IUD  BMI 31.50 kg/m²     General:  well developed; well nourished  no acute distress   Skin:  No suspicious lesions seen   Thyroid: normal to inspection and palpation   Breasts:  Examined in supine position  Symmetric without masses or skin dimpling  Nipples normal without inversion, lesions or discharge  There are no palpable axillary nodes   Nipple piercings noted bilaterally   Abdomen: soft, non-tender; no masses  no umbilical or inguinal hernias are present  no hepato-splenomegaly   Pelvis: Clinical staff was present for exam  :  urethral meatus normal;  Vaginal:  normal pink mucosa without prolapse or lesions.  Cervix:  normal appearance.iud strings noted in correct placement extending through cervical os  Uterus:  normal size, shape and consistency.  Adnexa:  normal bimanual exam of the adnexa.  Rectal:  digital rectal exam not performed; anus visually normal appearing.        Assessment   1. Well woman gynecological exam     Plan     1. Pap was not done today.  I explained to Jesica that the recommendations for Pap smear interval in a low risk patient has lengthened to 3 years time.  I told Jesica she still needs to be seen in our office yearly for a full physical including breast and pelvic exam.  2. She was encouraged to get yearly mammograms.  She should report any palpable breast lump(s) or skin changes regardless of mammographic " findings.  I explained to Jesica that notification regarding her mammogram results will come from the center performing the study.  Our office will not be routinely calling with mammogram results.  It is her responsibility to make sure that the results from the mammogram are communicated to her by the breast center.  If she has any questions about the results, she is welcome to call our office anytime.  3. No prescription was given or electronically sent at today's visit  4. The importance of keeping all planned follow-up and taking all medications as prescribed was emphasized.  5. Today I discussed with Jesica the total recommended calcium intake for a premenopausal female is 1000 mg.  Ideally this should be from dietary sources.  I reviewed calcium content in various foods including milk, fortified orange juice and yogurt.  If she cannot get sufficient calcium through dietary means, it is recommended to supplement with either a multivitamin or calcium to reach her daily goal.  I also reviewed the difference in the bioavailability of calcium carbonate and calcium citrate containing supplements and the importance of taking calcium carbonate containing products with food.  Finally, vitamin D's role in calcium absorption was reviewed and a total daily vitamin D intake of 800 units was recommended.  6. Follow up for annual exam 1 year    No orders of the defined types were placed in this encounter.         This note was electronically signed.    JAXON Watts  September 26, 2022

## 2022-11-28 NOTE — PROGRESS NOTES
"Subjective   Chief Complaint   Patient presents with   • Follow-up     Pt here to see if she has a possible yeast infection that may have started on  or . Pt states that she had intercourse with her partner for the 1st after 3 yrs. Pt states that her sx are:vaginal dryness and itchy. Pt did used OTC med that was outdated  and when that did not help with her dryness brenden antibacteria soap that caused burning. Pt had did use a one OTC and sx have not gone away.     Jesica Aguilera is a 42 y.o. year old .  No LMP recorded (lmp unknown). (Menstrual status: Other).  She presents to be seen because of vaginal irritation. She first noticed symptoms on -. She has had sic since her last visit, this was the first time she has been sexually active in about 3 years.   She states she started having vaginal itching.  She had leftover Terazol cream at home and use this for 1 night.  She states this initially helped her symptoms returned.  She then used an antibacterial soap.  She states her symptoms resolved for a little while but then after using the antibacterial soap she had vaginal burning.  She would like STI screening today.  The following portions of the patient's history were reviewed and updated as appropriate:current medications and allergies    Social History    Tobacco Use      Smoking status: Former        Packs/day: 3.00        Years: 27.00        Pack years: 81        Types: Cigarettes        Start date: 1993        Quit date: 10/15/2020        Years since quittin.1      Smokeless tobacco: Never         Objective   /88   Ht 160 cm (63\")   Wt 77.1 kg (170 lb)   LMP  (LMP Unknown) Comment: Mirena  BMI 30.11 kg/m²   Pelvic exam: normal external genitalia, vulva, vagina, cervix, uterus and adnexa, VULVA: normal appearing vulva with no masses, tenderness or lesions, VAGINA: normal appearing vagina with normal color and discharge, no lesions, CERVIX: normal appearing " cervix without discharge or lesions, WET MOUNT done - results: clue cells,  Positive whiff, iud strings noted in correct placement extending through cervical os.    Lab Review   No data reviewed    Imaging   No data reviewed        Assessment   1. Vaginal irrtation     Plan   1. Wet prep findings consistent with bv, possible yeast, rx for flagyl (discussed need to avoid alcohol) and diflucan sent to pharmacy  2. One swab sent for sti screening, yeast and vaginosis panel  3. Discussed need to avoid antibacterial or scented soaps in vaginal area  4. The importance of keeping all planned follow-up and taking all medications as prescribed was emphasized.  5. Return to care if symptoms worsen or persist    No orders of the defined types were placed in this encounter.         This note was electronically signed.    Brittany Gutiérrez, APRN  November 29, 2022

## 2022-11-29 ENCOUNTER — OFFICE VISIT (OUTPATIENT)
Dept: OBSTETRICS AND GYNECOLOGY | Facility: CLINIC | Age: 42
End: 2022-11-29

## 2022-11-29 VITALS
SYSTOLIC BLOOD PRESSURE: 130 MMHG | DIASTOLIC BLOOD PRESSURE: 88 MMHG | WEIGHT: 170 LBS | BODY MASS INDEX: 30.12 KG/M2 | HEIGHT: 63 IN

## 2022-11-29 DIAGNOSIS — N89.8 VAGINAL IRRITATION: Primary | ICD-10-CM

## 2022-11-29 PROCEDURE — 87210 SMEAR WET MOUNT SALINE/INK: CPT | Performed by: NURSE PRACTITIONER

## 2022-11-29 PROCEDURE — 99213 OFFICE O/P EST LOW 20 MIN: CPT | Performed by: NURSE PRACTITIONER

## 2022-11-29 RX ORDER — FLUCONAZOLE 150 MG/1
150 TABLET ORAL DAILY
Qty: 1 TABLET | Refills: 0 | Status: SHIPPED | OUTPATIENT
Start: 2022-11-29

## 2022-11-29 RX ORDER — METRONIDAZOLE 500 MG/1
500 TABLET ORAL 2 TIMES DAILY
Qty: 14 TABLET | Refills: 0 | Status: SHIPPED | OUTPATIENT
Start: 2022-11-29 | End: 2022-12-06

## 2022-12-05 ENCOUNTER — TELEPHONE (OUTPATIENT)
Dept: OBSTETRICS AND GYNECOLOGY | Facility: CLINIC | Age: 42
End: 2022-12-05

## 2022-12-05 NOTE — TELEPHONE ENCOUNTER
Called and informed patient that results are not in, she verified understanding - we will call once results arrive.

## 2022-12-13 ENCOUNTER — TELEPHONE (OUTPATIENT)
Dept: OBSTETRICS AND GYNECOLOGY | Facility: CLINIC | Age: 42
End: 2022-12-13

## 2022-12-13 NOTE — TELEPHONE ENCOUNTER
Patient called in inquiring about lab results again. It does not appear that the specimen that was sent to [facility] has resurfaced at this time. Scheduled patient for reswab tomorrow morning, routing to physician for heads up and to ensure coding to prevent duplicate charges.

## 2022-12-14 ENCOUNTER — OFFICE VISIT (OUTPATIENT)
Dept: OBSTETRICS AND GYNECOLOGY | Facility: CLINIC | Age: 42
End: 2022-12-14

## 2022-12-14 VITALS
DIASTOLIC BLOOD PRESSURE: 80 MMHG | HEIGHT: 63 IN | WEIGHT: 166.4 LBS | BODY MASS INDEX: 29.48 KG/M2 | SYSTOLIC BLOOD PRESSURE: 122 MMHG

## 2022-12-14 DIAGNOSIS — N89.8 VAGINAL IRRITATION: Primary | ICD-10-CM

## 2022-12-14 NOTE — PROGRESS NOTES
"Subjective   Chief Complaint   Patient presents with   • Follow-up     Pt here for a re swab     Jesica Aguilera is a 42 y.o. year old .  No LMP recorded (lmp unknown). (Menstrual status: Other).  She presents to office today for recollection of 1 swab as previous specimen was lost in transit.  She had swab collected for vaginal irritation and STI screening.  She was treated for possible yeast and bacterial vaginosis since her last visit.  She is denies any symptoms today.  She would like recollection of swab for STI screening and to ensure resolution of previously treated infections.    The following portions of the patient's history were reviewed and updated as appropriate:current medications and allergies    Social History    Tobacco Use      Smoking status: Former        Packs/day: 3.00        Years: 27.00        Pack years: 81        Types: Cigarettes        Start date: 1993        Quit date: 10/15/2020        Years since quittin.1      Smokeless tobacco: Never         Objective   /80 (BP Location: Left arm, Patient Position: Sitting, Cuff Size: Adult)   Ht 160 cm (63\")   Wt 75.5 kg (166 lb 6.4 oz)   LMP  (LMP Unknown) Comment: Mirena  BMI 29.48 kg/m²   Pelvic exam: VULVA: normal appearing vulva with no masses, tenderness or lesions, VAGINA: normal appearing vagina with normal color and discharge, no lesions, CERVIX: normal appearing cervix without discharge or lesions.    Lab Review   No data reviewed    Imaging   No data reviewed        Assessment   1. Vaginal irritation-currently resolved  2. STI screening     Plan   1. 1 swab collected for STI screening and vaginosis panel.  Will notify patient as soon as results are available.  2. The importance of keeping all planned follow-up and taking all medications as prescribed was emphasized.  3. Return to care for annual or as needed  4. No charge for today's visit as visit was a reflection for previously lost specimen.    No orders of " the defined types were placed in this encounter.         This note was electronically signed.    Brittany Gutiérrez, JAXON  December 14, 2022

## 2022-12-16 ENCOUNTER — TELEPHONE (OUTPATIENT)
Dept: OBSTETRICS AND GYNECOLOGY | Facility: CLINIC | Age: 42
End: 2022-12-16

## 2022-12-16 NOTE — TELEPHONE ENCOUNTER
We are  aware that the sample from 11/29/2022  is RADHA . Patient did however  did do a repeat sample on 12/14/2022. I spoke to Tyrone Stein and asked her to rollins this order due to the one on 11/29/2022 was lost or misplaced.  I told Tyrone that no  carrier show up on 12/14/2022 to  our [facility] order. I spoke to Tyrone today and asked her again to please put a rush on this order due to the patient has called and is upset that the office has not gotten her results. Aureliano stated she would see what she could do.

## 2022-12-19 ENCOUNTER — TELEPHONE (OUTPATIENT)
Dept: OBSTETRICS AND GYNECOLOGY | Facility: CLINIC | Age: 42
End: 2022-12-19

## 2022-12-19 NOTE — TELEPHONE ENCOUNTER
Patient and called and spoke with patient, she is calling in regards to her swab results (a retest due to lab misplacing original specimen).      Routing to physician to advise on results that have arrived.    Also transferred call to Linda clinical manager to speak with patient re: concerns about mishaps with [facility] lab pickups/specimen/etc.

## 2023-01-25 DIAGNOSIS — F41.9 ANXIETY: ICD-10-CM

## 2023-01-26 RX ORDER — DIAZEPAM 5 MG/1
TABLET ORAL
Qty: 30 TABLET | Refills: 0 | OUTPATIENT
Start: 2023-01-26

## 2023-04-19 NOTE — TELEPHONE ENCOUNTER
----- Message from Pastora Stein sent at 3/13/2018 10:04 AM EDT -----  Regarding: issues  Contact: [phone]  Patient left message that she is having some issues with a possible boil and would like a call back.   Returned pt's call, states has 2 boils at present, one opened up night before last, she has a history of these.  States she did an epsom salt bath, and is quite uncomfortable saying they are rubbing against her clothes. Offered pt an appt, transferred to reception to schedule.     Double O-Z Flap Text: The defect edges were debeveled with a #15 scalpel blade.  Given the location of the defect, shape of the defect and the proximity to free margins a Double O-Z flap was deemed most appropriate.  Using a sterile surgical marker, an appropriate transposition flap was drawn incorporating the defect and placing the expected incisions within the relaxed skin tension lines where possible. The area thus outlined was incised deep to adipose tissue with a #15 scalpel blade.  The skin margins were undermined to an appropriate distance in all directions utilizing iris scissors.